# Patient Record
Sex: FEMALE | Race: BLACK OR AFRICAN AMERICAN | Employment: PART TIME | URBAN - METROPOLITAN AREA
[De-identification: names, ages, dates, MRNs, and addresses within clinical notes are randomized per-mention and may not be internally consistent; named-entity substitution may affect disease eponyms.]

---

## 2020-03-13 LAB — PAP SMEAR, EXTERNAL: NORMAL

## 2020-07-29 RX ORDER — NORGESTIMATE AND ETHINYL ESTRADIOL 0.25-0.035
KIT ORAL
Qty: 1 DOSE PACK | Refills: 11 | Status: SHIPPED | OUTPATIENT
Start: 2020-07-29 | End: 2021-05-02

## 2021-02-04 PROBLEM — B96.89 BACTERIAL VAGINOSIS: Status: ACTIVE | Noted: 2021-02-04

## 2021-02-04 PROBLEM — A59.01 TRICHOMONAL VAGINITIS: Status: ACTIVE | Noted: 2021-02-04

## 2021-02-04 PROBLEM — N76.0 BACTERIAL VAGINOSIS: Status: ACTIVE | Noted: 2021-02-04

## 2021-02-19 ENCOUNTER — OFFICE VISIT (OUTPATIENT)
Dept: OBGYN CLINIC | Age: 26
End: 2021-02-19
Payer: COMMERCIAL

## 2021-02-19 VITALS
HEIGHT: 65 IN | WEIGHT: 126 LBS | DIASTOLIC BLOOD PRESSURE: 72 MMHG | SYSTOLIC BLOOD PRESSURE: 116 MMHG | BODY MASS INDEX: 20.99 KG/M2

## 2021-02-19 DIAGNOSIS — Z12.4 ENCOUNTER FOR SCREENING FOR MALIGNANT NEOPLASM OF CERVIX: ICD-10-CM

## 2021-02-19 DIAGNOSIS — Z01.419 GYNECOLOGIC EXAM NORMAL: Primary | ICD-10-CM

## 2021-02-19 PROCEDURE — 99395 PREV VISIT EST AGE 18-39: CPT | Performed by: OBSTETRICS & GYNECOLOGY

## 2021-02-19 RX ORDER — NORGESTIMATE AND ETHINYL ESTRADIOL 0.25-0.035
1 KIT ORAL DAILY
Qty: 1 DOSE PACK | Refills: 11 | Status: SHIPPED | OUTPATIENT
Start: 2021-02-19 | End: 2021-05-02

## 2021-02-21 NOTE — PROGRESS NOTES
Tessy Mckeon is a 23 y.o. female, , Patient's last menstrual period was 2021., who presents today for the following:  Chief Complaint   Patient presents with    Annual Exam        No Known Allergies    Current Outpatient Medications   Medication Sig    norgestimate-ethinyl estradioL (ORTHO-CYCLEN, SPRINTEC) 0.25-35 mg-mcg tab Take 1 Tab by mouth daily for 336 days.  Previfem 0.25-35 mg-mcg tab TAKE 1 TABLET DAILY     No current facility-administered medications for this visit. Past Medical History:   Diagnosis Date    Bacterial vaginosis 2021    Trichomonal vaginitis 2021       History reviewed. No pertinent surgical history. History reviewed. No pertinent family history. Social History     Socioeconomic History    Marital status: SINGLE     Spouse name: Not on file    Number of children: Not on file    Years of education: Not on file    Highest education level: Not on file   Occupational History    Not on file   Social Needs    Financial resource strain: Not on file    Food insecurity     Worry: Not on file     Inability: Not on file    Transportation needs     Medical: Not on file     Non-medical: Not on file   Tobacco Use    Smoking status: Never Smoker    Smokeless tobacco: Never Used   Substance and Sexual Activity    Alcohol use:  Yes    Drug use: Never    Sexual activity: Yes     Birth control/protection: Pill   Lifestyle    Physical activity     Days per week: Not on file     Minutes per session: Not on file    Stress: Not on file   Relationships    Social connections     Talks on phone: Not on file     Gets together: Not on file     Attends Faith service: Not on file     Active member of club or organization: Not on file     Attends meetings of clubs or organizations: Not on file     Relationship status: Not on file    Intimate partner violence     Fear of current or ex partner: Not on file     Emotionally abused: Not on file     Physically abused: Not on file     Forced sexual activity: Not on file   Other Topics Concern    Not on file   Social History Narrative    Not on file         HPI  Annual    Review of Systems   Constitutional: Negative. Respiratory: Negative. Cardiovascular: Negative. Gastrointestinal: Negative. Genitourinary: Negative. Musculoskeletal: Negative. Skin: Negative. Neurological: Negative. Endo/Heme/Allergies: Negative. Psychiatric/Behavioral: Negative. All other systems reviewed and are negative. /72 (BP 1 Location: Right arm, BP Patient Position: Sitting)   Ht 5' 5\" (1.651 m)   Wt 126 lb (57.2 kg)   LMP 01/25/2021   BMI 20.97 kg/m²    OBGyn Exam     Constitutional:     General Appearance: healthy-appearing, well-nourished, and well-developed; Level of Distress: NAD. Ambulation: ambulating normally. Psychiatric:   Insight: good judgement. Mental Status: normal mood and affect and active and alert. Orientation: to time, place, and person. Memory: recent memory normal and remote memory normal.     Head: Head: normocephalic and atraumatic. Neck:   Neck: supple, FROM, trachea midline, and no masses. Lymph Nodes: no cervical LAD, supraclavicular LAD, axillary LAD, or inguinal LAD. Thyroid: no enlargement or nodules and non-tender. Lungs:   Respiratory effort: no dyspnea. Cardiovascular:     Pulses including femoral / pedal: normal throughout. Breast: Breast: no masses or abnormal secretions and normal appearance. Abdomen:   no tenderness, guarding, masses, rebound tenderness, or CVA tenderness and non-distended. Female :   External genitalia: no lesions or rash and normal.   Vagina: moist mucosa; .   Cervix: no discharge, inflammation, or cervical motion tenderness   Uterus: midline, smooth, and non-tender; normal size   Adnexae: no adnexal mass or tenderness and size WNL.    Bladder and Urethra: normal bladder and urethra (except where noted). Musculoskeletal[de-identified]   Motor Strength and Tone: normal tone and motor strength. Joints, Bones, and Muscles: no contractures, malalignment, tenderness, or bony abnormalities and normal movement of all extremities. Extremities: no cyanosis, edema, varicosities, or palpable cord. Skin:   Inspection and palpation: no rash, lesions, ulcer, induration, nodules, jaundice, or abnormal nevi and good turgor. Nails: normal.     Back: Thoracolumbar Appearance: normal curvature. 1. Gynecologic exam normal    - PAP IG, CT-NG-TV, RFX APTIMA HPV ASCUS ([id number])    2. Encounter for screening for malignant neoplasm of cervix          Follow-up and Dispositions    · Return in about 1 year (around 2/19/2022).

## 2021-02-22 LAB
C TRACH RRNA CVX QL NAA+PROBE: NEGATIVE
CYTOLOGIST CVX/VAG CYTO: NORMAL
CYTOLOGY CVX/VAG DOC CYTO: NORMAL
CYTOLOGY CVX/VAG DOC THIN PREP: NORMAL
DX ICD CODE: NORMAL
LABCORP, 190119: NORMAL
Lab: NORMAL
N GONORRHOEA RRNA CVX QL NAA+PROBE: NEGATIVE
OTHER STN SPEC: NORMAL
STAT OF ADQ CVX/VAG CYTO-IMP: NORMAL
T VAGINALIS RRNA SPEC QL NAA+PROBE: NEGATIVE

## 2021-04-30 ENCOUNTER — HOSPITAL ENCOUNTER (INPATIENT)
Age: 26
LOS: 2 days | Discharge: HOME | End: 2021-05-02
Attending: OBSTETRICS & GYNECOLOGY | Admitting: OBSTETRICS & GYNECOLOGY
Payer: COMMERCIAL

## 2021-04-30 ENCOUNTER — ANESTHESIA EVENT (OUTPATIENT)
Dept: SURGERY | Age: 26
End: 2021-04-30
Payer: COMMERCIAL

## 2021-04-30 ENCOUNTER — ANESTHESIA (OUTPATIENT)
Dept: SURGERY | Age: 26
End: 2021-04-30
Payer: COMMERCIAL

## 2021-04-30 DIAGNOSIS — G89.18 POSTOPERATIVE PAIN: Primary | ICD-10-CM

## 2021-04-30 PROBLEM — O46.90 VAGINAL BLEEDING IN PREGNANCY: Status: ACTIVE | Noted: 2021-04-30

## 2021-04-30 PROBLEM — Z34.90 PREGNANCY: Status: ACTIVE | Noted: 2021-04-30

## 2021-04-30 LAB
ABO + RH BLD: NORMAL
AMPHET UR QL SCN: NEGATIVE
APPEARANCE UR: CLEAR
BACTERIA URNS QL MICRO: NEGATIVE /HPF
BARBITURATES UR QL SCN: NEGATIVE
BASOPHILS # BLD: 0 K/UL (ref 0–0.1)
BASOPHILS NFR BLD: 0 % (ref 0–1)
BENZODIAZ UR QL: NEGATIVE
BILIRUB UR QL: NEGATIVE
BLOOD GROUP ANTIBODIES SERPL: NEGATIVE
CANNABINOIDS UR QL SCN: NEGATIVE
COCAINE UR QL SCN: NEGATIVE
COLOR UR: NORMAL
DIFFERENTIAL METHOD BLD: ABNORMAL
DRUG SCRN COMMENT,DRGCM: ABNORMAL
EOSINOPHIL # BLD: 0.2 K/UL (ref 0–0.4)
EOSINOPHIL NFR BLD: 1 % (ref 0–7)
ERYTHROCYTE [DISTWIDTH] IN BLOOD BY AUTOMATED COUNT: 12.6 % (ref 11.5–14.5)
FIBRONECTIN FETAL VAG QL: NORMAL
GLUCOSE UR STRIP.AUTO-MCNC: NEGATIVE MG/DL
HCT VFR BLD AUTO: 35.3 % (ref 35–47)
HGB BLD-MCNC: 11.2 G/DL (ref 11.5–16)
HGB UR QL STRIP: NEGATIVE
IMM GRANULOCYTES # BLD AUTO: 0.1 K/UL (ref 0–0.04)
IMM GRANULOCYTES NFR BLD AUTO: 0 % (ref 0–0.5)
KETONES UR QL STRIP.AUTO: NEGATIVE MG/DL
LEUKOCYTE ESTERASE UR QL STRIP.AUTO: NEGATIVE
LYMPHOCYTES # BLD: 2.6 K/UL (ref 0.8–3.5)
LYMPHOCYTES NFR BLD: 19 % (ref 12–49)
MCH RBC QN AUTO: 30.6 PG (ref 26–34)
MCHC RBC AUTO-ENTMCNC: 31.7 G/DL (ref 30–36.5)
MCV RBC AUTO: 96.4 FL (ref 80–99)
METHADONE UR QL: NEGATIVE
MONOCYTES # BLD: 1.5 K/UL (ref 0–1)
MONOCYTES NFR BLD: 11 % (ref 5–13)
MUCOUS THREADS URNS QL MICRO: NORMAL /LPF
NEUTS SEG # BLD: 9.9 K/UL (ref 1.8–8)
NEUTS SEG NFR BLD: 69 % (ref 32–75)
NITRITE UR QL STRIP.AUTO: NEGATIVE
OPIATES UR QL: POSITIVE
PCP UR QL: NEGATIVE
PH UR STRIP: 6 [PH] (ref 5–8)
PLATELET # BLD AUTO: 172 K/UL (ref 150–400)
PMV BLD AUTO: 13.3 FL (ref 8.9–12.9)
PROT UR STRIP-MCNC: NEGATIVE MG/DL
RBC # BLD AUTO: 3.66 M/UL (ref 3.8–5.2)
RBC #/AREA URNS HPF: NORMAL /HPF (ref 0–5)
SP GR UR REFRACTOMETRY: 1 (ref 1–1.03)
SPECIMEN EXP DATE BLD: NORMAL
UROBILINOGEN UR QL STRIP.AUTO: 0.1 EU/DL (ref 0.1–1)
WBC # BLD AUTO: 14.3 K/UL (ref 3.6–11)
WBC URNS QL MICRO: NORMAL /HPF (ref 0–4)

## 2021-04-30 PROCEDURE — 74011250636 HC RX REV CODE- 250/636

## 2021-04-30 PROCEDURE — 76010000392 HC C SECN EA ADDL 0.5 HR: Performed by: OBSTETRICS & GYNECOLOGY

## 2021-04-30 PROCEDURE — 76060000033 HC ANESTHESIA 1 TO 1.5 HR: Performed by: OBSTETRICS & GYNECOLOGY

## 2021-04-30 PROCEDURE — 99285 EMERGENCY DEPT VISIT HI MDM: CPT

## 2021-04-30 PROCEDURE — 80307 DRUG TEST PRSMV CHEM ANLYZR: CPT

## 2021-04-30 PROCEDURE — 88305 TISSUE EXAM BY PATHOLOGIST: CPT

## 2021-04-30 PROCEDURE — 74011250636 HC RX REV CODE- 250/636: Performed by: ANESTHESIOLOGY

## 2021-04-30 PROCEDURE — 75410000003 HC RECOV DEL/VAG/CSECN EA 0.5 HR

## 2021-04-30 PROCEDURE — 82731 ASSAY OF FETAL FIBRONECTIN: CPT

## 2021-04-30 PROCEDURE — 75810000275 HC EMERGENCY DEPT VISIT NO LEVEL OF CARE

## 2021-04-30 PROCEDURE — 76815 OB US LIMITED FETUS(S): CPT

## 2021-04-30 PROCEDURE — 74011250636 HC RX REV CODE- 250/636: Performed by: NURSE ANESTHETIST, CERTIFIED REGISTERED

## 2021-04-30 PROCEDURE — 86850 RBC ANTIBODY SCREEN: CPT

## 2021-04-30 PROCEDURE — 87086 URINE CULTURE/COLONY COUNT: CPT

## 2021-04-30 PROCEDURE — 75410000002 HC LABOR FEE PER 1 HR

## 2021-04-30 PROCEDURE — 74011250636 HC RX REV CODE- 250/636: Performed by: OBSTETRICS & GYNECOLOGY

## 2021-04-30 PROCEDURE — 88307 TISSUE EXAM BY PATHOLOGIST: CPT

## 2021-04-30 PROCEDURE — 76010000391 HC C SECN FIRST 1 HR: Performed by: OBSTETRICS & GYNECOLOGY

## 2021-04-30 PROCEDURE — 81001 URINALYSIS AUTO W/SCOPE: CPT

## 2021-04-30 PROCEDURE — 59515 CESAREAN DELIVERY: CPT | Performed by: OBSTETRICS & GYNECOLOGY

## 2021-04-30 PROCEDURE — 74011000258 HC RX REV CODE- 258: Performed by: NURSE ANESTHETIST, CERTIFIED REGISTERED

## 2021-04-30 PROCEDURE — 96360 HYDRATION IV INFUSION INIT: CPT

## 2021-04-30 PROCEDURE — 65410000002 HC RM PRIVATE OB

## 2021-04-30 PROCEDURE — 85025 COMPLETE CBC W/AUTO DIFF WBC: CPT

## 2021-04-30 PROCEDURE — 4A1HX4Z MONITORING OF PRODUCTS OF CONCEPTION, CARDIAC ELECTRICAL ACTIVITY, EXTERNAL APPROACH: ICD-10-PCS | Performed by: OBSTETRICS & GYNECOLOGY

## 2021-04-30 RX ORDER — SODIUM CHLORIDE 0.9 % (FLUSH) 0.9 %
5-40 SYRINGE (ML) INJECTION EVERY 8 HOURS
Status: DISCONTINUED | OUTPATIENT
Start: 2021-04-30 | End: 2021-04-30

## 2021-04-30 RX ORDER — OXYCODONE AND ACETAMINOPHEN 5; 325 MG/1; MG/1
1 TABLET ORAL
Status: DISCONTINUED | OUTPATIENT
Start: 2021-04-30 | End: 2021-05-02 | Stop reason: HOSPADM

## 2021-04-30 RX ORDER — PHENYLEPHRINE HCL IN 0.9% NACL 1 MG/10 ML
SYRINGE (ML) INTRAVENOUS
Status: DISPENSED
Start: 2021-04-30 | End: 2021-04-30

## 2021-04-30 RX ORDER — OXYTOCIN/RINGER'S LACTATE 20/1000 ML
PLASTIC BAG, INJECTION (ML) INTRAVENOUS
Status: DISCONTINUED | OUTPATIENT
Start: 2021-04-30 | End: 2021-04-30 | Stop reason: HOSPADM

## 2021-04-30 RX ORDER — OXYTOCIN/RINGER'S LACTATE 30/500 ML
87.3 PLASTIC BAG, INJECTION (ML) INTRAVENOUS AS NEEDED
Status: COMPLETED | OUTPATIENT
Start: 2021-04-30 | End: 2021-04-30

## 2021-04-30 RX ORDER — NALOXONE HYDROCHLORIDE 0.4 MG/ML
0.1 INJECTION, SOLUTION INTRAMUSCULAR; INTRAVENOUS; SUBCUTANEOUS AS NEEDED
Status: DISCONTINUED | OUTPATIENT
Start: 2021-04-30 | End: 2021-04-30

## 2021-04-30 RX ORDER — SODIUM CHLORIDE, SODIUM LACTATE, POTASSIUM CHLORIDE, CALCIUM CHLORIDE 600; 310; 30; 20 MG/100ML; MG/100ML; MG/100ML; MG/100ML
INJECTION, SOLUTION INTRAVENOUS
Status: DISCONTINUED | OUTPATIENT
Start: 2021-04-30 | End: 2021-04-30 | Stop reason: HOSPADM

## 2021-04-30 RX ORDER — SIMETHICONE 80 MG
80 TABLET,CHEWABLE ORAL AS NEEDED
Status: DISCONTINUED | OUTPATIENT
Start: 2021-04-30 | End: 2021-05-02 | Stop reason: HOSPADM

## 2021-04-30 RX ORDER — SODIUM CHLORIDE 0.9 % (FLUSH) 0.9 %
5-40 SYRINGE (ML) INJECTION AS NEEDED
Status: DISCONTINUED | OUTPATIENT
Start: 2021-04-30 | End: 2021-04-30

## 2021-04-30 RX ORDER — BISACODYL 5 MG
10 TABLET, DELAYED RELEASE (ENTERIC COATED) ORAL DAILY
Status: DISCONTINUED | OUTPATIENT
Start: 2021-04-30 | End: 2021-05-02 | Stop reason: HOSPADM

## 2021-04-30 RX ORDER — DEXAMETHASONE SODIUM PHOSPHATE 4 MG/ML
INJECTION, SOLUTION INTRA-ARTICULAR; INTRALESIONAL; INTRAMUSCULAR; INTRAVENOUS; SOFT TISSUE AS NEEDED
Status: DISCONTINUED | OUTPATIENT
Start: 2021-04-30 | End: 2021-04-30 | Stop reason: HOSPADM

## 2021-04-30 RX ORDER — ZOLPIDEM TARTRATE 5 MG/1
5 TABLET ORAL
Status: DISCONTINUED | OUTPATIENT
Start: 2021-04-30 | End: 2021-05-02 | Stop reason: HOSPADM

## 2021-04-30 RX ORDER — SODIUM CHLORIDE, SODIUM LACTATE, POTASSIUM CHLORIDE, CALCIUM CHLORIDE 600; 310; 30; 20 MG/100ML; MG/100ML; MG/100ML; MG/100ML
1000 INJECTION, SOLUTION INTRAVENOUS CONTINUOUS
Status: DISCONTINUED | OUTPATIENT
Start: 2021-04-30 | End: 2021-04-30

## 2021-04-30 RX ORDER — HYDROMORPHONE HYDROCHLORIDE 1 MG/ML
2 INJECTION, SOLUTION INTRAMUSCULAR; INTRAVENOUS; SUBCUTANEOUS ONCE
Status: COMPLETED | OUTPATIENT
Start: 2021-04-30 | End: 2021-04-30

## 2021-04-30 RX ORDER — NALOXONE HYDROCHLORIDE 0.4 MG/ML
0.4 INJECTION, SOLUTION INTRAMUSCULAR; INTRAVENOUS; SUBCUTANEOUS AS NEEDED
Status: DISCONTINUED | OUTPATIENT
Start: 2021-04-30 | End: 2021-05-02 | Stop reason: HOSPADM

## 2021-04-30 RX ORDER — KETOROLAC TROMETHAMINE 30 MG/ML
30 INJECTION, SOLUTION INTRAMUSCULAR; INTRAVENOUS EVERY 6 HOURS
Status: COMPLETED | OUTPATIENT
Start: 2021-04-30 | End: 2021-04-30

## 2021-04-30 RX ORDER — FENTANYL CITRATE 50 UG/ML
INJECTION, SOLUTION INTRAMUSCULAR; INTRAVENOUS AS NEEDED
Status: DISCONTINUED | OUTPATIENT
Start: 2021-04-30 | End: 2021-04-30 | Stop reason: HOSPADM

## 2021-04-30 RX ORDER — OXYTOCIN/RINGER'S LACTATE 30/500 ML
10 PLASTIC BAG, INJECTION (ML) INTRAVENOUS AS NEEDED
Status: COMPLETED | OUTPATIENT
Start: 2021-04-30 | End: 2021-04-30

## 2021-04-30 RX ORDER — OXYTOCIN/RINGER'S LACTATE 30/500 ML
PLASTIC BAG, INJECTION (ML) INTRAVENOUS
Status: COMPLETED
Start: 2021-04-30 | End: 2021-04-30

## 2021-04-30 RX ORDER — SODIUM CHLORIDE 0.9 % (FLUSH) 0.9 %
5-40 SYRINGE (ML) INJECTION AS NEEDED
Status: DISCONTINUED | OUTPATIENT
Start: 2021-04-30 | End: 2021-04-30 | Stop reason: HOSPADM

## 2021-04-30 RX ORDER — SODIUM CHLORIDE, SODIUM LACTATE, POTASSIUM CHLORIDE, CALCIUM CHLORIDE 600; 310; 30; 20 MG/100ML; MG/100ML; MG/100ML; MG/100ML
125 INJECTION, SOLUTION INTRAVENOUS CONTINUOUS
Status: DISCONTINUED | OUTPATIENT
Start: 2021-04-30 | End: 2021-05-01

## 2021-04-30 RX ORDER — KETOROLAC TROMETHAMINE 30 MG/ML
INJECTION, SOLUTION INTRAMUSCULAR; INTRAVENOUS
Status: COMPLETED
Start: 2021-04-30 | End: 2021-04-30

## 2021-04-30 RX ORDER — MORPHINE SULFATE IN 0.9 % NACL 150MG/30ML
PATIENT CONTROLLED ANALGESIA SYRINGE INTRAVENOUS CONTINUOUS
Status: DISCONTINUED | OUTPATIENT
Start: 2021-04-30 | End: 2021-05-01

## 2021-04-30 RX ORDER — OXYTOCIN/RINGER'S LACTATE 30/500 ML
10 PLASTIC BAG, INJECTION (ML) INTRAVENOUS AS NEEDED
Status: DISCONTINUED | OUTPATIENT
Start: 2021-04-30 | End: 2021-05-02 | Stop reason: HOSPADM

## 2021-04-30 RX ADMIN — KETOROLAC TROMETHAMINE 30 MG: 30 INJECTION, SOLUTION INTRAMUSCULAR; INTRAVENOUS at 23:56

## 2021-04-30 RX ADMIN — SODIUM CHLORIDE, POTASSIUM CHLORIDE, SODIUM LACTATE AND CALCIUM CHLORIDE: 600; 310; 30; 20 INJECTION, SOLUTION INTRAVENOUS at 04:25

## 2021-04-30 RX ADMIN — KETOROLAC TROMETHAMINE 30 MG: 30 INJECTION, SOLUTION INTRAMUSCULAR; INTRAVENOUS at 05:51

## 2021-04-30 RX ADMIN — Medication: at 04:39

## 2021-04-30 RX ADMIN — SODIUM CHLORIDE, POTASSIUM CHLORIDE, SODIUM LACTATE AND CALCIUM CHLORIDE 999 ML/HR: 600; 310; 30; 20 INJECTION, SOLUTION INTRAVENOUS at 05:30

## 2021-04-30 RX ADMIN — SODIUM CHLORIDE, POTASSIUM CHLORIDE, SODIUM LACTATE AND CALCIUM CHLORIDE 1000 ML: 600; 310; 30; 20 INJECTION, SOLUTION INTRAVENOUS at 04:30

## 2021-04-30 RX ADMIN — HYDROMORPHONE HYDROCHLORIDE 1 MG: 1 INJECTION, SOLUTION INTRAMUSCULAR; INTRAVENOUS; SUBCUTANEOUS at 06:03

## 2021-04-30 RX ADMIN — Medication 20 UNITS/HR: at 04:38

## 2021-04-30 RX ADMIN — KETOROLAC TROMETHAMINE 30 MG: 30 INJECTION, SOLUTION INTRAMUSCULAR; INTRAVENOUS at 12:49

## 2021-04-30 RX ADMIN — DEXAMETHASONE SODIUM PHOSPHATE 4 MG: 4 INJECTION, SOLUTION INTRA-ARTICULAR; INTRALESIONAL; INTRAMUSCULAR; INTRAVENOUS; SOFT TISSUE at 05:12

## 2021-04-30 RX ADMIN — SODIUM CHLORIDE, POTASSIUM CHLORIDE, SODIUM LACTATE AND CALCIUM CHLORIDE 125 ML/HR: 600; 310; 30; 20 INJECTION, SOLUTION INTRAVENOUS at 23:56

## 2021-04-30 RX ADMIN — SODIUM CHLORIDE, POTASSIUM CHLORIDE, SODIUM LACTATE AND CALCIUM CHLORIDE 125 ML/HR: 600; 310; 30; 20 INJECTION, SOLUTION INTRAVENOUS at 06:40

## 2021-04-30 RX ADMIN — KETOROLAC TROMETHAMINE 30 MG: 30 INJECTION, SOLUTION INTRAMUSCULAR; INTRAVENOUS at 18:18

## 2021-04-30 RX ADMIN — Medication 87.3 MILLI-UNITS/MIN: at 04:50

## 2021-04-30 RX ADMIN — Medication: at 07:18

## 2021-04-30 RX ADMIN — KETOROLAC TROMETHAMINE 30 MG: 30 INJECTION, SOLUTION INTRAMUSCULAR at 05:51

## 2021-04-30 RX ADMIN — FENTANYL CITRATE 200 MCG: 50 INJECTION, SOLUTION INTRAMUSCULAR; INTRAVENOUS at 04:39

## 2021-04-30 RX ADMIN — Medication 87.3 MILLI-UNITS/MIN: at 07:52

## 2021-04-30 RX ADMIN — SODIUM CHLORIDE, POTASSIUM CHLORIDE, SODIUM LACTATE AND CALCIUM CHLORIDE 1000 ML: 600; 310; 30; 20 INJECTION, SOLUTION INTRAVENOUS at 03:25

## 2021-04-30 RX ADMIN — CEFAZOLIN SODIUM 2 G: 1 INJECTION, POWDER, FOR SOLUTION INTRAMUSCULAR; INTRAVENOUS at 04:32

## 2021-04-30 NOTE — PROGRESS NOTES
Spiritual Care Assessment/Progress Note  [facility]      NAME: Jaimie Isabel      MRN: [id number]  AGE: 23 y.o. SEX: female  Restorationist Affiliation: Unknown   Language: English     4/30/2021     Total Time (in minutes): 68     Spiritual Assessment begun in [facility] LABOR & DELIVERY through conversation with:         [x]Patient        [x] Family    [] Friend(s)        Reason for Consult: Family care     Spiritual beliefs: (Please include comment if needed)     [x] Identifies with a tono tradition:         [] Supported by a tono community:            [] Claims no spiritual orientation:           [] Seeking spiritual identity:                [] Adheres to an individual form of spirituality:           [] Not able to assess:                           Identified resources for coping:      [x] Prayer                               [] Music                  [] Guided Imagery     [x] Family/friends                 [] Pet visits     [] Devotional reading                         [] Unknown     [] Other:                                               Interventions offered during this visit: (See comments for more details)    Patient Interventions: Prayer (actual), Other (comment)(Silent suppport)     Family/Friend(s):  Affirmation of emotions/emotional suffering, Affirmation of tono, Bridging, Catharsis/review of pertinent events in supportive environment, Coping skills reviewed/reinforced, Iconic (affirming the presence of God/Higher Power), Initial Assessment, Life review/legacy, Normalization of emotional/spiritual concerns, Prayer (actual), Prayer (assurance of), Restorationist beliefs/image of God discussed     Plan of Care:     [] Support spiritual and/or cultural needs    [] Support AMD and/or advance care planning process      [] Support grieving process   [] Coordinate Rites and/or Rituals    [] Coordination with community clergy   [] No spiritual needs identified at this time   [] Detailed Plan of Care below (See Comments)  [] Make referral to Music Therapy  [] Make referral to Pet Therapy     [] Make referral to Addiction services  [] Make referral to [facility]  [] Make referral to Spiritual Care Partner  [] No future visits requested        [x] Follow up upon further referrals     Comments:  Visited patient in the LD unit for family support. Patient Shaw Vazquez was being cared for by staff most of the time, and I provided care to patient's mom Jeffrey and boyfriend Amee Gtz. Jeffrey expressed her anxiety and seemed to process her emotions verbally and tearfully. She engaged in life review and shared about raising Marianne and her older sister, Romero's personality, future, and other attributes. Amee Gzt did not share much and expressed being overwhelmed by current situation. I provided chaplaincy education and listened with empathy and reflection while normalizing their anxious emotions. I facilitated storytelling and affirmed their support of each other. I prayed with Jeffrey per her request.  I advised of  availability. Chaplains will follow up if further referrals are requested. Chaplain Shanthi Davenport M.Div.    can be reached by calling the  at [facility]  [phone]

## 2021-04-30 NOTE — PROGRESS NOTES
0935: Patient transferred to [facility], room 323 from L&D via bed. Bedside report received from [address] [place] [address]. Patient oriented to room and call bell. Mother and baby handbook, new medication side effects, birth certificate form, local community resource sheet, hourly rounding, masking and visitation policy handouts given and discussed with patient. Vital signs obtained. Assessment completed. Patient instructed to call out for assistance. Patient verbalizes understanding.

## 2021-04-30 NOTE — PROCEDURES
Surgeon: Beth Keene MD    Assistant: none    Anesthesia: General     mL's    Urine output 50 mL's    Findings:  Viable male infant in transverse presentation back down  Minimal amniotic fluid  740 g    Drains: Walker    Specimens: Cord blood, cord gas, placenta    Complications: None    Surgical technique:  22-year-old black female  1 para 0 at 25 weeks, with prenatal care at [facility] for women who presented with abdominal pain, vaginal bleeding and findings suggestive of placental abruption. Anesthesia, nursing supervisor summoned. The patient was emergently taken to the operating room, where she was transferred to the OR table, simultaneously prepped, and a Walker catheter was placed. She was then draped and given general anesthesia. A 20 cm Pfannenstiel incision was made in the usual fashion, down to the level of the fascia. The fascia was then incised to the extent of the skin incision. Blunt dissection was then utilized to penetrate the rectus muscle the peritoneal surface. A disposable self-retaining retractor was placed. The lower uterine segment was visualized, and measured only approximately 4 cm, and was felt to not be sufficiently developed to deliver the fetus. In addition the fetus on ultrasound was transverse back down. Therefore decision was made to perform a classical  . A 15 cm vertical incision was made from the uterine fundus down to the lower uterine segment. The fetus was noted to be in transverse presentation with the fetal head on the patient's right-hand side. The breech aspect of the infant was then brought into the incision the upper extremities were flexed and delivered, followed easily by the fetal head. Minimal amount of amniotic fluid was noted. The cord was clamped and cut and the infant was handed off to awaiting  intensive care unit nursing staff. Viable male infant.     A segment of umbilical cord was obtained for cord gas analysis. Pitocin was given IV. The placenta was delivered, but there was some placental fragments adherent to the posterior wall which were removed with the aid of a wet lap pad. Wood clamps were then placed along the periphery of the uterine incision to minimize blood loss. The uterine defect was then irrigated. The uterine incision was closed in 2 layers of 0 PDS without difficulty. The incision was irrigated and a small amount of bleeding was noted. Pressure was applied over 2 to 3 minutes, and satisfactory hemostasis was obtained. A decision was made to close the peritoneal cavity. At that point, all lap pad counts were correct. The self-retaining retractor was removed. The rectus muscle was reapproximated with a running layer of 0 Vicryl. The subfascial structures were irrigated and bleeding sites were controlled with monopolar coagulating current. Once satisfactory hemostasis was obtained, the rectus sheath was reapproximated with 0 PDS. The subcutaneous fat was irrigated and reapproximated with a running layer of 3-0 Monocryl. The skin was then reapproximated with 3-0 Vicryl in a subcuticular fashion. Dermabond was applied. A 3M dressing was applied. The patient tolerated the procedure well. All counts were correct. The patient was then returned to her labor room to recover.

## 2021-04-30 NOTE — PROGRESS NOTES
0330:Dr. Decker Number informed about pt's arrival, SBAR done informed about Pt's compliant of leaking fluid and large amount of vaginal bleeding at home, moderate bright red blood possible mixed with fluid noted on towel that pt wore in, no active bleeding noted at this time, abdomen soft to palpate, pt denies pain now, strip reviewed with MD informed about decelerations, no contractions seen at this time. Recommendations made. MD states to monitor, strip reviewed by MD himself states ok. 2903: MD on unit strip reviewed by him.

## 2021-04-30 NOTE — PROGRESS NOTES
8023- Pt arrived to L&D via wheelchair. Pt presented to ER c/o vaginal bleeding. 0322- Fetal HR tracing on EFM. 0345- Pt denies feeling any contractions since coming to the hospital.    [phone]- On call OB/GYN at bedside asking pt H&P questions. RN at bedside changing pt's peripad, large amount of blood noted. OB/GYN assessed amount of bleeding. 0400- OB/GYN performed VE, states \"there is too much blood in the vagina for me to feel the cervix. \"    3349- On call OB/GYN performed bedside ultrasound states \"baby is transverse and there is little fluid. \"    Sere.Maw- OB/GYN called for emergent . 0425- Pt rushed to OR for emergent . 46- Live male infant delivered, see Delivery Summary for details. 7698- Pt returned to room, post-op recovery began, RN to remain at bedside monitoring pt.    0530- Pt hypotensive, IVF bolus initiated and CRNA administering IV phenylephrine. - Pt c/o pain, orders received and IV pain medications administered. 0630- Pt's pain easing up. Postpartum bleeding consistently monitored, small amount of rubra noted. 9996- Bedside shift change report given to Chiquis Watters RN and JEANNE Starkey RN.

## 2021-04-30 NOTE — H&P
Labor and Delivery/History & Physical    Primary Care Provider: None  Source of Information: Patient/family     History of Presenting Illness:   Jose Moreau is a  23 y.o. female BLACK/  LMP 11/3/2020 Estimated Date of Delivery: 8/10/21 estimated gestational age [id number], with uncomplicated prenatal care at [facility] for women, who presents with complaints of of abdominal pain and vaginal bleeding. Patient complains of abdominal pain which started about 2 AM, that comes and goes. It is nonradiating without alleviating or exacerbating factors without nausea vomiting fever chills dysuria or dyschezia. She also describes vaginal bleeding and states that she passed a bunch of blood and blood clots in the toilet prior to coming to the hospital.    She reports normal fetal movement. Review of System    Past Medical History:   Diagnosis Date    Bacterial vaginosis 2021    Trichomonal vaginitis 2021        History reviewed. No pertinent surgical history. Prior to Admission medications    Medication Sig Start Date End Date Taking? Authorizing Provider   norgestimate-ethinyl estradioL (ORTHO-CYCLEN, SPRINTEC) 0.25-35 mg-mcg tab Take 1 Tab by mouth daily for 336 days. 21  Jamshid Alves MD   Previfem 2.08-98 mg-mcg tab TAKE 1 TABLET DAILY 20   Jamshid Alves MD       No Known Allergies     History reviewed. No pertinent family history.      SOCIAL HISTORY:    Social History     Socioeconomic History    Marital status: SINGLE     Spouse name: Not on file    Number of children: Not on file    Years of education: Not on file    Highest education level: Not on file   Occupational History    Not on file   Social Needs    Financial resource strain: Not on file    Food insecurity     Worry: Not on file     Inability: Not on file    Transportation needs     Medical: Not on file     Non-medical: Not on file   Tobacco Use    Smoking status: Former Smoker    Smokeless tobacco: Never Used   Substance and Sexual Activity    Alcohol use: Not Currently    Drug use: Never    Sexual activity: Yes     Birth control/protection: Pill   Lifestyle    Physical activity     Days per week: Not on file     Minutes per session: Not on file    Stress: Not on file   Relationships    Social connections     Talks on phone: Not on file     Gets together: Not on file     Attends Baptism service: Not on file     Active member of club or organization: Not on file     Attends meetings of clubs or organizations: Not on file     Relationship status: Not on file    Intimate partner violence     Fear of current or ex partner: Not on file     Emotionally abused: Not on file     Physically abused: Not on file     Forced sexual activity: Not on file   Other Topics Concern     Service Not Asked    Blood Transfusions Not Asked    Caffeine Concern Not Asked    Occupational Exposure Not Asked   Lannis Anon Hazards Not Asked    Sleep Concern Not Asked    Stress Concern Not Asked    Weight Concern Not Asked    Special Diet Not Asked    Back Care Not Asked    Exercise Not Asked    Bike Helmet Not Asked   [address] [facility] Not Asked    Self-Exams Not Asked   Social History Narrative    Not on file          Objective:     Physical Exam:     Visit Vitals  /72   Pulse (!) 102   Temp 97.3 °F (36.3 °C)   Resp 18   Ht 5' 5\" (1.651 m)   Wt 63.5 kg (140 lb)   LMP 11/03/2020 (LMP Unknown)   SpO2 99%   BMI 23.30 kg/m²      O2 Device: None      Constitutional:   Chaperone: accepted and present. General Appearance: healthy-appearing, well-nourished, and well-developed; thin black female. Level of Distress: NAD. Ambulation: ambulating normally. Psychiatric:   Insight: good judgement. Mental Status: normal mood and affect and active and alert. Orientation: to time, place, and person.    Memory: recent memory normal and remote memory normal.     Head: Head: normocephalic and atraumatic. Lungs:   Respiratory effort: no dyspnea. Auscultation: no wheezing, rales/crackles, or rhonchi and breath sounds normal, good air movement, and CTA except as noted. Cardiovascular:   Heart Auscultation: normal S1 and S2; no murmurs, rubs, or gallops; and RRR. Pulses including femoral / pedal: normal throughout. Abdomen: Bowel Sounds: normal.   Inspection and Palpation: Soft, gravid, no tenderness, guarding, masses, rebound tenderness, or CVA tenderness and non-distended. Fetal heart tracin bpm baseline with moderate variability and spontaneous decelerations  Tocodynamometer: No contractions; contractions are palpable abdominally    Vagina: Copious blood clot  Cervix: 1 cm? Difficult to assess    Musculoskeletal[de-identified]   Motor Strength and Tone: normal tone and motor strength. Joints, Bones, and Muscles: no contractures, malalignment, tenderness, or bony abnormalities and normal movement of all extremities. Extremities: Edema; no cyanosis,  varicosities, or palpable cord. Neurologic:   Gait and Station: normal gait and station. Sensation: grossly intact. Reflexes: DTRs 2+ bilaterally throughout. Skin:   Inspection and palpation: no rash, lesions, ulcer, induration, nodules, jaundice, or abnormal nevi and good turgor. Nails: normal.     Back: Thoracolumbar Appearance: normal curvature.      Bedside ultrasound: Obvious oligohydramnios; transverse lie back down    24 Hour Results:    Recent Results (from the past 24 hour(s))   CBC WITH AUTOMATED DIFF    Collection Time: 21  4:30 AM   Result Value Ref Range    WBC 14.3 (H) 3.6 - 11.0 K/uL    RBC 3.66 (L) 3.80 - 5.20 M/uL    HGB 11.2 (L) 11.5 - 16.0 g/dL    HCT 35.3 35.0 - 47.0 %    MCV 96.4 80.0 - 99.0 FL    MCH 30.6 26.0 - 34.0 PG    MCHC 31.7 30.0 - 36.5 g/dL    RDW 12.6 11.5 - 14.5 %    PLATELET 400 182 - 410 K/uL    MPV 13.3 (H) 8.9 - 12.9 FL    NEUTROPHILS 69 32 - 75 %    LYMPHOCYTES 19 12 - 49 %    MONOCYTES 11 5 - 13 %    EOSINOPHILS 1 0 - 7 %    BASOPHILS 0 0 - 1 %    IMMATURE GRANULOCYTES 0 0.0 - 0.5 %    ABS. NEUTROPHILS 9.9 (H) 1.8 - 8.0 K/UL    ABS. LYMPHOCYTES 2.6 0.8 - 3.5 K/UL    ABS. MONOCYTES 1.5 (H) 0.0 - 1.0 K/UL    ABS. EOSINOPHILS 0.2 0.0 - 0.4 K/UL    ABS. BASOPHILS 0.0 0.0 - 0.1 K/UL    ABS. IMM.  GRANS. 0.1 (H) 0.00 - 0.04 K/UL    DF AUTOMATED           Imaging:   No orders to display        Assessment:     25w3d week gestation  Spontaneous decelerations on external fetal monitor  Vaginal bleeding and uterine contraction pattern suggestive of abruption       Plan:     Emergent  delivery       Home medications were reviewed    Signed By: Melba Mcgraw MD     2021

## 2021-04-30 NOTE — ANESTHESIA POSTPROCEDURE EVALUATION
Procedure(s):   SECTION.     general    Anesthesia Post Evaluation      Multimodal analgesia: multimodal analgesia used between 6 hours prior to anesthesia start to PACU discharge  Patient location during evaluation: bedside  Patient participation: complete - patient participated  Level of consciousness: awake and awake and alert  Pain score: 4  Pain management: adequate  Airway patency: patent  Anesthetic complications: no  Cardiovascular status: acceptable, hemodynamically stable and blood pressure returned to baseline  Respiratory status: acceptable, spontaneous ventilation, room air, unassisted and nonlabored ventilation  Hydration status: acceptable  Post anesthesia nausea and vomiting:  none  Final Post Anesthesia Temperature Assessment:  Normothermia (36.0-37.5 degrees C)      INITIAL Post-op Vital signs:   Vitals Value Taken Time   /72 21 0610   Temp 36.3 °C (97.3 °F) 21 0610   Pulse 98 21 0610   Resp 16 21 0610   SpO2 99 % 21 0610

## 2021-04-30 NOTE — PROGRESS NOTES
0700- Received bedside shift report from Stefano Michael RN.    4010- Pt transferred to MIU, and bedside report given to Kosta Anderson RN from [facility], RN.

## 2021-05-01 LAB
BASOPHILS # BLD: 0 K/UL (ref 0–0.1)
BASOPHILS NFR BLD: 0 % (ref 0–1)
DIFFERENTIAL METHOD BLD: ABNORMAL
EOSINOPHIL # BLD: 0 K/UL (ref 0–0.4)
EOSINOPHIL NFR BLD: 0 % (ref 0–7)
ERYTHROCYTE [DISTWIDTH] IN BLOOD BY AUTOMATED COUNT: 12.7 % (ref 11.5–14.5)
HCT VFR BLD AUTO: 23.5 % (ref 35–47)
HGB BLD-MCNC: 7.5 G/DL (ref 11.5–16)
IMM GRANULOCYTES # BLD AUTO: 0 K/UL (ref 0–0.04)
IMM GRANULOCYTES NFR BLD AUTO: 0 % (ref 0–0.5)
LYMPHOCYTES # BLD: 2.3 K/UL (ref 0.8–3.5)
LYMPHOCYTES NFR BLD: 15 % (ref 12–49)
MCH RBC QN AUTO: 31.1 PG (ref 26–34)
MCHC RBC AUTO-ENTMCNC: 31.9 G/DL (ref 30–36.5)
MCV RBC AUTO: 97.5 FL (ref 80–99)
MONOCYTES # BLD: 1.6 K/UL (ref 0–1)
MONOCYTES NFR BLD: 10 % (ref 5–13)
NEUTS SEG # BLD: 11.4 K/UL (ref 1.8–8)
NEUTS SEG NFR BLD: 75 % (ref 32–75)
PLATELET # BLD AUTO: 132 K/UL (ref 150–400)
PMV BLD AUTO: 13 FL (ref 8.9–12.9)
RBC # BLD AUTO: 2.41 M/UL (ref 3.8–5.2)
WBC # BLD AUTO: 15.3 K/UL (ref 3.6–11)

## 2021-05-01 PROCEDURE — 74011250636 HC RX REV CODE- 250/636: Performed by: OBSTETRICS & GYNECOLOGY

## 2021-05-01 PROCEDURE — 36415 COLL VENOUS BLD VENIPUNCTURE: CPT

## 2021-05-01 PROCEDURE — 74011250637 HC RX REV CODE- 250/637: Performed by: OBSTETRICS & GYNECOLOGY

## 2021-05-01 PROCEDURE — 65410000002 HC RM PRIVATE OB

## 2021-05-01 PROCEDURE — 85025 COMPLETE CBC W/AUTO DIFF WBC: CPT

## 2021-05-01 RX ORDER — IBUPROFEN 800 MG/1
800 TABLET ORAL
Status: DISCONTINUED | OUTPATIENT
Start: 2021-05-01 | End: 2021-05-02 | Stop reason: HOSPADM

## 2021-05-01 RX ADMIN — IBUPROFEN 800 MG: 800 TABLET, FILM COATED ORAL at 13:41

## 2021-05-01 RX ADMIN — SODIUM CHLORIDE, POTASSIUM CHLORIDE, SODIUM LACTATE AND CALCIUM CHLORIDE 125 ML/HR: 600; 310; 30; 20 INJECTION, SOLUTION INTRAVENOUS at 09:30

## 2021-05-01 RX ADMIN — BISACODYL 10 MG: 5 TABLET, COATED ORAL at 09:30

## 2021-05-01 NOTE — PROGRESS NOTES
2017: Shift assessment completed. Pt educated about importance of changing positions in bed frequently post-operatively. Pt encouraged to roll side to side at least every 1-2 hours while on bedrest. Pt voiced understanding. Pt states doing ok and denies needing anything at this time. 2120: Rounded. Pt lying in bed talking on cell phone. VS obtained. Water pitcher refilled. 2218: Rounded. Pt lying in bed with eyes closed and RR even and unlabored. 2351: Rounded. Pericare done with patient lying in bed. Pt able to lift hips off of bed to place fresh peripads underneath buttocks. Provon catheter care wipes used and pt educated. Scheduled Toradol 30 mg administered IV push and pt educated. New bag LR infusing via IV pump at 125 ml/hr. Pt states doing well right now and denies needing anything. 0140: Rounded. VS obtained. Pt. Denies needing anything. 0344: Rounded. Pt lying in bed with eyes closed. RR even and unlabored. 0518: Rounded. Indwelling urinary catheter discontinued and pt tolerated well. Catheter tip intact. Pericare done with patient lying in bed. Pt instructed to call for assistance from nurse before getting out of bed for the first time. Pt voiced understanding and call bell within reach. VS obtained and surgical dressing to abdominal incision removed. Pt tolerated well. Pt states doing ok and denies needing anything at this time.

## 2021-05-01 NOTE — PROGRESS NOTES
0701: Rounded with shift report. Report given to JUAN Bran RN. Pt lying in bed with eyes closed. RR even and unlabored.

## 2021-05-02 VITALS
WEIGHT: 140 LBS | HEART RATE: 77 BPM | TEMPERATURE: 97.9 F | SYSTOLIC BLOOD PRESSURE: 95 MMHG | BODY MASS INDEX: 23.32 KG/M2 | OXYGEN SATURATION: 100 % | RESPIRATION RATE: 16 BRPM | DIASTOLIC BLOOD PRESSURE: 57 MMHG | HEIGHT: 65 IN

## 2021-05-02 LAB
BACTERIA SPEC CULT: NORMAL
SPECIAL REQUESTS,SREQ: NORMAL

## 2021-05-02 PROCEDURE — 65410000002 HC RM PRIVATE OB

## 2021-05-02 PROCEDURE — 74011250637 HC RX REV CODE- 250/637: Performed by: OBSTETRICS & GYNECOLOGY

## 2021-05-02 RX ORDER — IBUPROFEN 800 MG/1
800 TABLET ORAL
Qty: 40 TAB | Refills: 0 | Status: SHIPPED | OUTPATIENT
Start: 2021-05-02 | End: 2022-02-12

## 2021-05-02 RX ORDER — OXYCODONE AND ACETAMINOPHEN 5; 325 MG/1; MG/1
1 TABLET ORAL
Qty: 30 TAB | Refills: 0 | Status: SHIPPED | OUTPATIENT
Start: 2021-05-02 | End: 2021-05-07

## 2021-05-02 RX ADMIN — IBUPROFEN 800 MG: 800 TABLET, FILM COATED ORAL at 01:11

## 2021-05-02 RX ADMIN — BISACODYL 10 MG: 5 TABLET, COATED ORAL at 08:42

## 2021-05-02 NOTE — PROGRESS NOTES
Progress Note    Patient: Jose Moreau MRN: [id number]  SSN: [id number]    YOB: 1995  Age: 23 y.o. Sex: female      Admit Date: 2021    LOS: 2 days     Subjective:     Patient is without complaints, she is tolerating a regular diet, she reports minimal lochia    Objective:     Vitals:    21 1630 21 2020 21 2316 21 0836   BP: (!) 99/57 107/73 104/71 (!) 95/57   Pulse: 83 88 87 77   Resp: 16 18 18 16   Temp: 98.2 °F (36.8 °C) 98 °F (36.7 °C) 98.1 °F (36.7 °C) 97.9 °F (36.6 °C)   SpO2: 100% 100% 100% 100%   Weight:       Height:            Physical Exam:   Heart is with regular rate and rhythm  Lungs are clear  Abdomen is soft appropriately tender postop  Fundus is below umbilicus  Wound is clean dry and intact  Extremities without clubbing cyanosis or edema    Lab/Data Review:  No new labs    Assessment:     Active Problems:    Pregnancy (2021)      Vaginal bleeding in pregnancy (2021)    Postoperative day #2 status post classical  for placental abruption, stable.       Plan:     Discharged home today    Signed By: Jeovany Adams MD     May 2, 2021

## 2021-05-02 NOTE — DISCHARGE INSTRUCTIONS
Patient Education   Patient Education   Patient Education   Patient Education   Patient Education   Patient Education   Ibuprofen (By mouth)   Ibuprofen (eye-bue-PROE-fen)  Treats pain and fever. This medicine is an NSAID. Brand Name(s): Advil, Advil Children's, Advil Liqui-Gels, Advil Migraine, All-Purpose First Aid Kit, Children's Ibuprofen, Children's Motrin, Comfort Pac, Concentrated Motrin Infants' Drops, Equate Ibuprofen Alvin Strength, Genpril, [facility] Ibuprofen Infants', [facility] Children's Ibuprofen, [facility] Ibuprofen, [facility] Ibuprofen Alvin Strength   There may be other brand names for this medicine. When This Medicine Should Not Be Used: This medicine is not right for everyone. Do not use if you had an allergic reaction (including asthma) to ibuprofen, aspirin, or another NSAID, or right before or after heart surgery. How to Use This Medicine:   Capsule, Liquid Filled Capsule, Suspension, Tablet, Chewable Tablet  · Your doctor will tell you how much medicine to use. Do not use more than directed. · Prescription ibuprofen should come with a Medication Guide. Ask your pharmacist for the Medication Guide if you do not have one. · Follow the instructions on the medicine label if you are using this medicine without a prescription. · Take this medicine with food or milk if it upsets your stomach. · Oral liquid: Shake well just before using. Measure with a marked measuring spoon, oral syringe, or medicine cup. · Chewable tablet: Chew completely before you swallow it. Then drink some water to make sure you swallow all of the medicine. · For Children: Ask your pharmacist if you are not sure how much medicine to give a child. The dose is usually based on weight, not age. Never give more medicine than directed. · For Adults: Do not take more than 6 pills in 1 day (24 hours) unless your doctor tells you to. · Missed dose:  If you take this medicine on a regular basis and miss a dose, take it as soon as you can. If it is almost time for your next dose, wait until then to use the medicine and skip the missed dose. Do not use extra medicine to make up for a missed dose. · Store the medicine in a closed container at room temperature, away from heat, moisture, and direct light. Do not freeze the oral liquid. Drugs and Foods to Avoid:   Ask your doctor or pharmacist before using any other medicine, including over-the-counter medicines, vitamins, and herbal products. · Some foods and medicine can affect how ibuprofen works. Tell your doctor if you are also using lithium, methotrexate, a blood thinner (such as warfarin), a steroid medicine (such as hydrocortisone, prednisolone, prednisone), a diuretic (water pill), or an ACE inhibitor blood pressure medicine. · Do not use any other NSAID medicine unless your doctor says it is okay. Some other NSAIDs are aspirin, diclofenac, naproxen, or celecoxib. · Do not drink alcohol while you are using this medicine. Warnings While Using This Medicine:   · Tell your doctor if you are pregnant or breastfeeding. Do not use this medicine during the later part of pregnancy. · Tell your doctor if you have kidney disease, liver disease, asthma, lupus or a similar connective tissue disease, or a history of ulcers or other digestion problems. Tell your doctor if you smoke or have heart or blood circulation problems, including high blood pressure, heart failure (CHF), or bleeding problems. · This medicine may cause the following problems:  ¨ Bleeding and ulcers in the stomach or intestines  ¨ Higher risk of heart attack or stroke  ¨ Liver damage  ¨ Kidney damage  ¨ Vision problems  · Call your doctor if symptoms get worse, pain lasts more than 10 days, or fever lasts more than 3 days. · This medicine might contain sugar or phenylalanine (aspartame).   · Tell any doctor or dentist who treats you that you are using this medicine. · Keep all medicine out of the reach of children. Never share your medicine with anyone. Possible Side Effects While Using This Medicine:   Call your doctor right away if you notice any of these side effects:  · Allergic reaction: Itching or hives, swelling in your face or hands, swelling or tingling in your mouth or throat, chest tightness, trouble breathing  · Blistering, peeling, or red skin rash  · Change in how much or how often you urinate  · Chest pain that may spread to your arms, jaw, back, or neck, trouble breathing, nausea, unusual sweating, faintness  · Chest pain, trouble breathing, weakness on one side of your body, severe headache, trouble seeing or talking, pain in your lower leg  · Dark urine or pale stools, nausea, vomiting, loss of appetite, stomach pain, yellow skin or eyes  · Fever, neck pain, stiff neck  · Severe stomach pain, vomiting blood, bloody or black, tarry stools  · Swelling in your hands, ankles, or feet, rapid weight gain  · Trouble seeing, blind spots, change in how you see colors  · Unusual bleeding, bruising, or weakness  If you notice these less serious side effects, talk with your doctor:   · Constipation, diarrhea, gas, mild upset stomach  · Dizziness, headache, ringing in the ears  If you notice other side effects that you think are caused by this medicine, tell your doctor. Call your doctor for medical advice about side effects. You may report side effects to FDA at 4-882-FDA-2702  © 2017 [facility] Information is for End User's use only and may not be sold, redistributed or otherwise used for commercial purposes. The above information is an  only. It is not intended as medical advice for individual conditions or treatments. Talk to your doctor, nurse or pharmacist before following any medical regimen to see if it is safe and effective for you.         Oxycodone/Acetaminophen (Percocet, Roxicet) - (By mouth)   Why this medicine is used:   Treats pain. This medicine contains a narcotic pain reliever. Contact a nurse or doctor right away if you have:  · Extreme weakness, shallow breathing, slow heartbeat  · Sweating or cold, clammy skin  · Skin blisters, rash, or peeling     Common side effects:  · Constipation  · Nausea, vomiting  · Tiredness  2017 [employer] Information is for End User's use only and may not be sold, redistributed or otherwise used for commercial purposes.  Section: What to Expect at Home    Your Recovery:  A  section, or , is surgery to deliver your baby through a cut, called an incision that the doctor makes in your lower belly and uterus. You may have some pain in your lower belly and need pain medicine for 1 to 2 weeks. You can expect some vaginal bleeding for several weeks. You will probably need about 6 weeks to fully recover. It is important to take it easy while the incision is healing. Avoid heavy lifting, strenuous activities, or exercises that strain the belly muscles while you are recovering. Ask a family member or friend for help with housework, cooking, and shopping. This care sheet gives you a general idea about how long it will take for you to recover. But each person recovers at a different pace. Follow the steps below to get better as quickly as possible. How can you care for yourself at home? Activity  · Rest when you feel tired. Getting enough sleep will help you recover. · Try to walk each day. Start by walking a little more than you did the day before. Bit by bit, increase the amount you walk. Walking boosts blood flow and helps prevent pneumonia, constipation, and blood clots. · Avoid strenuous activities, such as bicycle riding, jogging, weightlifting, and aerobic exercise, for 6 weeks or until your doctor says it is okay. · Until your doctor says it is okay, do not lift anything heavier than your baby.   · Do not do sit-ups or other exercise that strain the belly muscles for 6 weeks or until your doctor says it is okay. · Hold a pillow over your incision when you cough or take deep breaths. This will support your belly and decrease your pain. · You may shower as usual. Pat the incision dry when you are done. · You will have some vaginal bleeding. Wear sanitary pads. Do not douche or use tampons until your doctor says it is okay. · Ask your doctor when you can drive again. · You will probably need to take at least 6 weeks off work. It depends on the type of work you do and how you feel. · Wait until you are healed (about 4 to 6 weeks) before you have sexual intercourse. Your doctor will tell you when it is okay to have sex. · Talk to your doctor about birth control. You can get pregnant even before your period returns. Also, you can get pregnant while you are breast-feeding. Incision care  Your skin is your body's first line of defense against germs, but an incision site leaves an easy way for germs to enter your body. To prevent infection:  · Clean your hands frequently and before and after changing any touching any dressings. · If you have strips of tape on the incision, leave the tape on for a week or until it falls off. · Look at your incision closely every day for any changes. Contact your doctor if you experience any signs of infection, such as fever or increased redness at the surgical site. · Wash the area daily with warm, soapy water, and pat it dry. Don't use hydrogen peroxide or alcohol, which can slow healing. You may cover the area with a gauze bandage if it weeps or rubs against clothing. Change the bandage every day. · Keep the area clean and dry. Diet  · You can eat your normal diet. If your stomach is upset, try bland, low-fat foods like plain rice, broiled chicken, toast, and yogurt. · Drink plenty of fluids (unless your doctor tells you not to).   · You may notice that your bowel movements are not regular right after your surgery. This is common. Try to avoid constipation and straining with bowel movements. You may want to take a fiber supplement every day. If you have not had a bowel movement after a couple of days, ask your doctor about taking a mild laxative. · If you are breast-feeding, do not drink any alcohol. Medicines  · Take pain medicines exactly as directed. · If the doctor gave you a prescription medicine for pain, take it as prescribed. · If you are not taking a prescription pain medicine, ask your doctor if you can take an over-the-counter medicine such as acetaminophen (Tylenol), ibuprofen (Advil, Motrin), or naproxen (Aleve), for cramps. Read and follow all instructions on the label. Do not take aspirin, because it can cause more bleeding. Do not take acetaminophen (Tylenol) and other acetaminophen containing medications (i.e. Percocet) at the same time. · If you think your pain medicine is making you sick to your stomach:  · Take your medicine after meals (unless your doctor has told you not to). · Ask your doctor for a different pain medicine. · If your doctor prescribed antibiotics, take them as directed. Do not stop taking them just because you feel better. You need to take the full course of antibiotics. Mental Health  · Many women get the \"baby blues\" during the first few days after childbirth. You may lose sleep, feel irritable, and cry easily. You may feel happy one minute and sad the next. Hormone changes are one cause of these emotional changes. Also, the demands of a new baby, along with visits from relatives or other family needs, add to a mother's stress. The \"baby blues\" often peak around the fourth day. Then they ease up in less than 2 weeks. · If your moodiness or anxiety lasts for more than 2 weeks, or if you feel like life is not worth living, you may have postpartum depression. This is different for each mother.  Some mothers with serious depression may worry intensely about their infant's well-being. Others may feel distant from their child. Some mothers might even feel that they might harm their baby. A mother may have signs of paranoia, wondering if someone is watching her. · With all the changes in your life, you may not know if you are depressed. Pregnancy sometimes causes changes in how you feel that are similar to the symptoms of depression. · Symptoms of depression include:  · Feeling sad or hopeless and losing interest in daily activities. These are the most common symptoms of depression. · Sleeping too much or not enough. · Feeling tired. You may feel as if you have no energy. · Eating too much or too little. · [employer] ([employer]) offers a Warm line; Chat with the Expert phone sessions; Information and Articles about Pregnancy and Postpartum Mood Disorders; Comprehensive List of Free Support Groups; Knowledgeable local coordinators who will offer support, information, and resources; Guide to Resources on [employer]; Calendar of events in the  mood disorders community; Latest News and Research; and [facility] [address] for [employer]. Remember - You are not alone; You are not to blame; With help, you will be well. 1-085-287-PPD(9756). WWW. [employer]. NET   · Writing or talking about death, such as writing suicide notes or talking about guns, knives, or pills. Keep the numbers for these national suicide hotlines: 1-280-724-TALK (8-244.129.9486) and 1-814-JQCJXKL (5-438.517.3550). If you or someone you know talks about suicide or feeling hopeless, get help right away. Other instructions  · If you breast-feed your baby, you may be more comfortable while you are healing if you place the baby so that he or she is not resting on your belly. Try tucking your baby under your arm, with his or her body along the side you will be feeding on. Support your baby's upper body with your arm.  With that hand you can control your baby's head to bring his or her mouth to your breast. This is sometimes called the football hold. Follow-up care is a key part of your treatment and safety. Be sure to make and go to all appointments, and call your doctor if you are having problems. It's also a good idea to know your test results and keep a list of the medicines you take. When should you call for help? Call 911 anytime you think you may need emergency care. For example, call if:  · You are thinking of hurting yourself, your baby, or anyone else. · You passed out (lost consciousness). · You have symptoms of a blood clot in your lung (called a pulmonary embolism). These may include:  · Sudden chest pain. · Trouble breathing. · Coughing up blood. Call your doctor now or seek immediate medical care if:    · You have severe vaginal bleeding. · You are soaking through a pad each hour for 2 or more hours. · Your vaginal bleeding seems to be getting heavier or is still bright red 4 days after delivery. · You are dizzy or lightheaded, or you feel like you may faint. · You are vomiting or cannot keep fluids down. · You have a fever. · You have new or more belly pain. · You have loose stitches, or your incision comes open. · You have symptoms of infection, such as:  · Increased pain, swelling, warmth, or redness. · Red streaks leading from the incision. · Pus draining from the incision. · A fever  · You pass tissue (not just blood). · Your vaginal discharge smells bad. · Your belly feels tender or full and hard. · Your breasts are continuously painful or red. · You feel sad, anxious, or hopeless for more than a few days. · You have sudden, severe pain in your belly. · You have symptoms of a blood clot in your leg (called a deep vein thrombosis), such as:  · Pain in your calf, back of the knee, thigh, or groin. · Redness and swelling in your leg or groin.   · You have symptoms of preeclampsia, such as:  · Sudden swelling of your face, hands, or feet. · New vision problems (such as dimness or blurring). · A severe headache. · Your blood pressure is higher than it should be or rises suddenly. · You have new nausea or vomiting. Watch closely for changes in your health, and be sure to contact your doctor if you have any problems. Stress in Parents of Infants: Care Instructions  Your Care Instructions     Meeting the increased demands of being a new parent can be a big challenge. It is easy to get overtired and overwhelmed during the first weeks. What used to be a simple chore, such as buying groceries, is not so simple now. Plus, you have new chores, including feeding and changing your new baby. At the end of the day, you may be so tired that you feel like crying. Instead of looking forward to the next day, you may be dreading tomorrow. Like many new parents, you are burned out from the stress of having a new baby. Stress affects each of us differently, and the most effective ways to relieve it are different for each person. You can try different methods to find out which ones work best for you. As the weeks go by, you will begin to develop a rhythm with your baby. Tasks that now seem to take forever will become easier. Many women get the \"baby blues\" during the first few days after childbirth. If you are a new mother and the \"baby blues\" last more than a few days, call your doctor right away. Depression is a medical condition that requires treatment. Follow-up care is a key part of your treatment and safety. Be sure to make and go to all appointments, and call your doctor if you are having problems. It's also a good idea to know your test results and keep a list of the medicines you take. How can you care for yourself at home? · Be kind to yourself. Your new baby takes a lot of work, but he or she can give you a lot of pleasure too. Do not worry about housekeeping for a while.   · Allow your friends to bring you meals or do chores. · Limit visitors to as few as you feel you can handle, or ask them not to visit for a while. Before they come, set a limit on how long they will stay. · Sleep when your baby sleeps. Even a short nap helps. · Find what triggers your stress, and avoid those things as much as you can. · If you breastfeed, learn how to collect and store some breast milk so your partner or  can feed the baby while you sleep. Experts usually recommend waiting about a month until breastfeeding is going well before offering a bottle. · Eat a balanced diet so you can keep up your energy. · Drink plenty of fluids throughout the day. · Avoid caffeine and alcohol. Caffeine is found in coffee, tea, cola drinks, chocolate, and other foods. · Limit medicines that can make you more tired, such as tranquilizers and cold and allergy medicines. · Get regular daily exercise, such as walks, to help improve your mood. Rest after you exercise. · Be honest with yourself and those who care about you. Tell them you are stressed and tired. · Talking to other new parents can help. Ask your doctor or child's doctor to suggest support groups for new parents. Hearing that someone else is having the same experiences you are can help a lot. · If you have the baby blues for more than a few days, call your doctor right away. When should you call for help? Call 911 anytime you think you may need emergency care. For example, call if:    · You have thoughts of hurting yourself, your baby, or another person. Call your doctor now or seek immediate medical care if:    · You are having trouble caring for yourself or your baby. Watch closely for changes in your health, and be sure to contact your doctor if you have any problems. Where can you learn more? Go to http://www.gray.com/  Enter H142 in the search box to learn more about \"Stress in Parents of Infants: Care Instructions. \"  Current as of: May 27, 2020               Content Version: 12.8  © 2006-2021 [employer]. Care instructions adapted under license by [employer] (which disclaims liability or warranty for this information). If you have questions about a medical condition or this instruction, always ask your healthcare professional. [address] any warranty or liability for your use of this information. Depression After Childbirth: Care Instructions  Overview     Many women get the \"baby blues\" during the first few days after childbirth. You may lose sleep, feel irritable, and cry easily. You may feel happy one minute and sad the next. Hormone changes are one cause of these emotional changes. Also, the demands of a new baby, along with visits from relatives or other family needs, can add to the stress. The \"baby blues\" often peak around the fourth day. Then they ease up in less than 2 weeks. If your moodiness or anxiety lasts for more than 2 weeks, or if you feel like life is not worth living, you may have postpartum depression. This is different for each person. Some mothers with serious depression may worry intensely about their infant's well-being. Others may feel distant from their child. Some mothers may even feel that they might harm their baby. Some may have signs of paranoia, wondering if someone is watching them. Depression is not a sign of weakness. It's a medical condition that requires treatment. Medicine and counseling often work well to reduce depression. Talk to your doctor about taking antidepressant medicine while breastfeeding. Follow-up care is a key part of your treatment and safety. Be sure to make and go to all appointments, and call your doctor if you are having problems. It's also a good idea to know your test results and keep a list of the medicines you take. How do you know if you are depressed? With all the changes in your life, you may not know if you are depressed. Pregnancy sometimes causes changes in how you feel that are similar to the symptoms of depression. Symptoms of depression include:  · Feeling sad or hopeless and losing interest in daily activities. These are the most common symptoms of depression. · Sleeping too much or not enough. · Feeling tired. You may feel as if you have no energy. · Eating too much or too little. · Writing or talking about death, such as writing suicide notes or talking about guns, knives, or pills. Keep the numbers for these national suicide hotlines: 0-710-023-TALK (1-566.463.8332) and 6-542-RRQJUIA (6-519.494.8590). If you or someone you know talks about suicide or feeling hopeless, get help right away. How can you care for yourself at home? · Be safe with medicines. Take your medicines exactly as prescribed. Call your doctor if you think you are having a problem with your medicine. · Eat a healthy diet so that you can keep up your energy. · Get regular daily exercise, such as walks, to help improve your mood. · Get as much sunlight as possible. Keep your shades and curtains open. Get outside as much as you can. · Avoid using alcohol or other substances to feel better. · Get as much rest and sleep as possible. Avoid doing too much. Being too tired can increase depression. · Play stimulating music throughout your day and soothing music at night. · Schedule outings and visits with friends and family. Ask them to call you regularly, so that you don't feel alone. · Ask for help with preparing food and other daily tasks. Family and friends are often happy to help with a . · Be honest with yourself and those who care about you. Tell them about your struggle. · Join a support group of new mothers. No one can better understand the challenges of caring for a  than other new mothers. · If you feel like life is not worth living or you're feeling hopeless, get help right away.  Keep the numbers for these national suicide hotlines: 8-145-012-TALK (8-920.293.4950) and 3-338-NCYFNZE (7-564.668.2202). When should you call for help? Call 911 anytime you think you may need emergency care. For example, call if:    · You feel you cannot stop from hurting yourself, your baby, or someone else. Call your doctor now or seek immediate medical care if:    · You are having trouble caring for yourself or your baby.     · You hear voices. Watch closely for changes in your health, and be sure to contact your doctor if:    · You have problems with your depression medicine.     · You do not get better as expected. Where can you learn more? Go to http://www.gray.com/  Enter [id number] in the search box to learn more about \"Depression After Childbirth: Care Instructions. \"  Current as of: September 23, 2020               Content Version: 12.8  © 2006-2021 [employer]. Care instructions adapted under license by [employer] (which disclaims liability or warranty for this information). If you have questions about a medical condition or this instruction, always ask your healthcare professional. [place] [address] any warranty or liability for your use of this information. Constipation: Care Instructions  Your Care Instructions     Constipation means that you have a hard time passing stools (bowel movements). People pass stools from 3 times a day to once every 3 days. What is normal for you may be different. Constipation may occur with pain in the rectum and cramping. The pain may get worse when you try to pass stools. Sometimes there are small amounts of bright red blood on toilet paper or the surface of stools. This is because of enlarged veins near the rectum (hemorrhoids). A few changes in your diet and lifestyle may help you avoid ongoing constipation. Your doctor may also prescribe medicine to help loosen your stool. Some medicines can cause constipation.  These include pain medicines and antidepressants. Tell your doctor about all the medicines you take. Your doctor may want to make a medicine change to ease your symptoms. Follow-up care is a key part of your treatment and safety. Be sure to make and go to all appointments, and call your doctor if you are having problems. It's also a good idea to know your test results and keep a list of the medicines you take. How can you care for yourself at home? · Drink plenty of fluids. If you have kidney, heart, or liver disease and have to limit fluids, talk with your doctor before you increase the amount of fluids you drink. · Include high-fiber foods in your diet each day. These include fruits, vegetables, beans, and whole grains. · Get at least 30 minutes of exercise on most days of the week. Walking is a good choice. You also may want to do other activities, such as running, swimming, cycling, or playing tennis or team sports. · Take a fiber supplement, such as Citrucel or Metamucil, every day. Read and follow all instructions on the label. · Schedule time each day for a bowel movement. A daily routine may help. Take your time having your bowel movement. · Support your feet with a small step stool when you sit on the toilet. This helps flex your hips and places your pelvis in a squatting position. · Your doctor may recommend an over-the-counter laxative to relieve your constipation. Examples are Milk of Magnesia and MiraLax. Read and follow all instructions on the label. Do not use laxatives on a long-term basis. When should you call for help? Call your doctor now or seek immediate medical care if:    · You have new or worse belly pain.     · You have new or worse nausea or vomiting.     · You have blood in your stools. Watch closely for changes in your health, and be sure to contact your doctor if:    · Your constipation is getting worse.     · You do not get better as expected. Where can you learn more?   Go to http://www.gray.com/  Enter P343 in the search box to learn more about \"Constipation: Care Instructions. \"  Current as of: February 26, 2020               Content Version: 12.8  © 2006-2021 [employer]. Care instructions adapted under license by [employer] (which disclaims liability or warranty for this information). If you have questions about a medical condition or this instruction, always ask your healthcare professional. [address] any warranty or liability for your use of this information. Breastfeeding: Care Instructions  Overview     Breastfeeding has many benefits. It may lower your baby's chances of getting an infection. It also may make it less likely that your baby will have problems such as diabetes and obesity later in life. Breastfeeding also helps you bond with your baby. In the first days after birth, your breasts make a thick, yellow liquid called colostrum. This liquid gives your baby nutrients and antibodies against infection. It is all that babies need in the first days after birth. Your breasts will fill with milk a few days after the birth. Breastfeeding is a skill that gets better with practice. Be patient with yourself and your baby. If you have trouble, you can get help and keep breastfeeding. Follow-up care is a key part of your treatment and safety. Be sure to make and go to all appointments, and call your doctor if you are having problems. It's also a good idea to know your test results and keep a list of the medicines you take. How can you care for yourself at home? · Breastfeed your baby whenever he or she is hungry. In the first 2 weeks, your baby will breastfeed at least 8 times in a 24-hour period. This will help you keep up your supply of milk. Signs that your baby is hungry include:  ? Sucking on his or her hands. ? [place] his or her lips.   ? Turning his or her head toward your breast.  · Put a bed pillow or a nursing pillow on your lap to support your arms and your baby. · Hold your baby in a comfortable position. ? You can hold your baby in several ways. One of the most common positions is the cradle hold. One arm supports your baby, with his or her head in the bend of your elbow. Your open hand supports your baby's bottom or back. Your baby's belly lies against yours. ? If you had your baby by , or , try the football hold. This position keeps your baby off your belly. Tuck your baby under your arm, with his or her body along the side you will be feeding on. Support your baby's upper body with your arm. With that hand you can control your baby's head to bring his or her mouth to your breast.  ? Try different positions with each feeding. If you are having problems, ask for help from your doctor or a lactation consultant. · To get your baby to latch on:  ? Support and narrow your breast with one hand using a \"U hold,\" with your thumb on the outer side of your breast and your fingers on the inner side. You can also use a \"C hold,\" with all your fingers below the nipple and your thumb above it. Try the different holds to get the deepest latch for whichever breastfeeding position you use. Your other arm is behind your baby's back, with your hand supporting the base of the baby's head. Position your fingers and thumb to point toward your baby's ears. ? You can touch your baby's lower lip with your nipple to get your baby to open his or her mouth. Wait until your baby opens up really wide, like a big yawn. Then be sure to bring the baby quickly to your breast--not your breast to the baby. As you bring your baby toward your breast, use your other hand to support the breast and guide it into his or her mouth. ? Both the nipple and a large portion of the darker area around the nipple (areola) should be in the baby's mouth.  The baby's lips should be flared outward, not folded in (inverted). ? Listen for a regular sucking and swallowing pattern while the baby is feeding. If you cannot see or hear a swallowing pattern, watch the baby's ears, which will wiggle slightly when the baby swallows. If the baby's nose appears to be blocked by your breast, bring your baby's body closer to you. This will help tilt the baby's head back slightly, so just the edge of one nostril is clear for breathing. ? When your baby is latched, you can usually remove your hand from supporting your breast and bring it under your baby to cradle him or her. Now just relax and breastfeed your baby. · You will know that your baby is feeding well when:  ? His or her mouth covers a lot of the areola, and the lips are flared out.  ? His or her chin and nose rest against your breast.  ? Sucking is deep and rhythmic, with short pauses. ? You are able to see and hear your baby swallowing. ? You do not feel pain in your nipple. · Offer both breasts to your baby at each feeding. Each time you breastfeed, switch which breast you start with. · Anytime you need to remove your baby from the breast, put one finger in the corner of his or her mouth. Push your finger between your baby's gums to gently break the seal. If you do not break the tight seal before you remove your baby, your nipples can become sore, cracked, or bruised. · After feeding your baby, gently pat his or her back to let out any swallowed air. After your baby burps, offer the breast again, or offer the other breast. Sometimes a baby will want to keep feeding after being burped. When should you call for help? Call your doctor now or seek immediate medical care if:    · You have symptoms of a breast infection, such as:  ? Increased pain, swelling, redness, or warmth around a breast.  ? Red streaks extending from the breast.  ? Pus draining from a breast.  ? A fever.     · Your baby has no wet diapers for 6 hours.    Watch closely for changes in your health, and be sure to contact your doctor if:    · Your baby has trouble latching on to your breast.     · You continue to have pain or discomfort when breastfeeding.     · You have other questions or concerns. Where can you learn more? Go to http://www.gray.com/  Enter P492 in the search box to learn more about \"Breastfeeding: Care Instructions. \"  Current as of: October 8, 2020               Content Version: 12.8  © 2006-2021 [employer]. Care instructions adapted under license by [employer] (which disclaims liability or warranty for this information). If you have questions about a medical condition or this instruction, always ask your healthcare professional. [address] any warranty or liability for your use of this information.

## 2021-05-02 NOTE — PROGRESS NOTES
1140 Discharge plan of care/case management plan validated with provider discharge order. Discharge instructions given to pt with verbal understanding. Prescriptions at pharmacy on file. Discussed when to take medication and side effects. Discussed follow up appointment. Pt needs to make follow up appointment for 6 weeks. Gave number and address on dc paper. . Discussed pelvic rest for 6 weeks,. Discussed when, what and how to notify provider. Discussed baby blues, post partum depression and anxiety. denies depression at this time. discussed post partum warning signs. no questions. Voiced understanding.    [id number] pt discharged via wheelchair with belongings to front entrance

## 2021-05-02 NOTE — PROGRESS NOTES
2020: Rounded. Pt ambulated back to bed after using restroom. Steady gait noted. VS obtained and assessment completed. Clean cloth chux pad provided for bed and peripads given. Trash bags changed in room and bathroom. Pt states doing well and denies needing anything. 2111: Rounded. Pt sitting up in bed speaking with female visitor at bedside. [facility] brought [facility]. Saltines given per request.     2207: Rounded. Pt lying in bed using cell phone and states doing ok. Pt denies needing anything. 2316: Rounded. VS obtained. Fundus, lochia, and abdominal incision reassessed. Pt denies needing anything. 0111: Rounded. Pt c/o incisional pain and medicated with Motrin. See MAR; water pitcher refilled.    0306: Rounded. Pt lying in bed with eyes closed. RR even and unlabored. 0574: Rounded. Pt lying in bed with eyes closed and easily arousable when writer entered room. IV lock to right forearm flushed with 10 ml normal saline. Pt denies needing anything.

## 2021-05-02 NOTE — PROGRESS NOTES
8655: Rounded with shift report. Report given to JUAN Sanderson RN. Pt lying in bed with eyes closed. RR even and unlabored.

## 2021-12-01 LAB
ANTIBODY SCREEN, EXTERNAL: NEGATIVE
CHLAMYDIA, EXTERNAL: NEGATIVE
HIV, EXTERNAL: NEGATIVE
RUBELLA, EXTERNAL: NORMAL
TYPE, ABO & RH, EXTERNAL: NORMAL

## 2021-12-15 ENCOUNTER — HOSPITAL ENCOUNTER (OUTPATIENT)
Dept: PERINATAL CARE | Age: 26
Discharge: HOME | End: 2021-12-15
Attending: OBSTETRICS & GYNECOLOGY

## 2021-12-15 NOTE — PROGRESS NOTES
Indication: History (Previous pregnancy): Pregnancy complications: Ruptured membranes.  birth prior classical cs.   ____________________________________________________________________________  Consultation:  Type of Consultation: early gestation; hx  birth and hamzah demise  Consultant: Margaret Mota  Ms Santiago Daniels presents today for consultation regarding her history of  birth in prior gestation, which was delivered 2021. She is currently feeling well, 13 weeks pregnant. She reports at appros 2 am of , she awoke with right sided pain. She experienced SROM of bloody fluid. She used the bathroom to urinate and when she got back to bed, she passed clots. She presented to [facility], and records from that hospitalization were reviewed. Her couse was signficant for the following: (all  of the following was copied directly from her EMR):     EXAM:     Bowel Sounds: normal.   Inspection and Palpation: Soft, gravid, no tenderness, guarding, masses, rebound tenderness, or CVA tenderness and non-distended. Fetal heart tracin bpm baseline with moderate variability and spontaneous decelerations  Tocodynamometer: No contractions; contractions are palpable abdominally    Vagina: Copious blood clot  Cervix: 1 cm? Difficult to assess    Bedside ultrasound: Obvious oligohydramnios; transverse lie back down    24 Hour Results:    Recent Results     Recent Results (from the past 24 hour(s))  CBC WITH AUTOMATED DIFF   Collection Time: 21  4:30 AM  Result Value Ref Range   WBC 14.3    RBC 3.66 (L   HGB 11.2   HCT 35.3    MCV 96.4 FL   MCH 30.6  PG   MCHC 31.7  g/dL   RDW 12.6  %   PLATELET 331 K/uL   MPV 13.3  FL   NEUTROPHILS 69 %   LYMPHOCYTES 19  %   MONOCYTES 11  %   EOSINOPHILS 1 %   BASOPHILS 0  %   IMMATURE GRANULOCYTES 0  %   ABS. NEUTROPHILS 9.9  K/UL   ABS. LYMPHOCYTES 2.6 K/UL   ABS. MONOCYTES 1.5 K/UL   ABS. EOSINOPHILS 0.2  K/UL   ABS. BASOPHILS 0.0  K/UL   ABS. IMM. GRANS. 0. 1K/UL       Urine drug screen: POSITVE for opiates; methadone negative. All other ASH values were negative      Fetal fibronectin: POSITIVE    Assessment:  25w3d week gestation Spontaneous decelerations on external fetal monitor Vaginal bleeding and uterine contraction pattern suggestive of abruption    MEDS: she received 1 dose BMZ one hour prior to delivery       Procedures  AL  DELIVERY+POSTPARTUM CARE    Surgeon: Shankar Duncan MD    Assistant: none    Anesthesia: General     mL's    Urine output 50 mL's    Findings: Viable male infant in transverse presentation back down Minimal amniotic fluid 740 g  Drains: Walker    Specimens: Cord blood, cord gas, placenta    Complications: None    Surgical technique: 65-year-old black female  1 para 0 at 25 weeks, with prenatal care at [facility] for women who presented with abdominal pain, vaginal bleeding and findings suggestive of placental abruption. Anesthesia, nursing supervisor summoned. The patient was emergently taken to the operating room, where she was transferred to the OR table, simultaneously prepped, and a Walker catheter was placed. She was then draped and given general anesthesia. A 15 cm vertical incision was made from the uterine fundus down to the lower uterine segment. The fetus was noted to be in transverse presentation with the fetal head on the patient's right-hand side. The breech aspect of the infant was then brought into the incision the upper extremities were flexed and delivered, followed easily by the fetal head. Minimal amount of amniotic fluid was noted. The cord was clamped and cut and the infant was handed off to awaiting  intensive care unit nursing staff. Viable male infant. Pathology report (placenta):   173 gram placenta second trimester placenta:   19 cm, three-vessel umbilical cord, with decreased twisting. Marginal hematoma. No evidence of acute chorioamnionitis.  Intraplacental hemorrhage. Immature placental villous configuration. Her son passed away in the NICU on 2021. In summary: this patient likely sustained placental abruption (confirmed by placental pathology)following SROM, and  labor, and underwent a classical  delivery. This was not likely infection mediated  I spent 30 minutes with the patient counseling face to face, discussing the following: recurrence risk for spontaneous  birth following PPROM, cervical length monitoring and indication for cerclage, and use of progesterone for prevention of recurrent  birth, and an additional 15 minutes reviewing her electronic records, labs, ultrasounds and pathology. She had not reported the use of illicit substances, or prescription medications; given that this is a screening test, there is a false positive rate associated with it, and definitive testing results could not be located. Unfortunately the only risk factors that she has that cannot currently be modified is her short interpregnancy interval, which not only poses risk for  birth but also for sga. Given this, it would not be unreasonable to monitor fetal growth in the 3rd trimester. I have recommended she return in 4 weeks for early anatomic survey and to initiate cervical length monitoring. I would recommend the use of Sandy for risk reduction for  birth, althoug shared decision making should be employed regarding the use or lack thereof of IM progesterone (given recent post approval studies of efficacy) vs use of vaginal progesterone off-label. She is aware that she will require repeat  delivery this pregnancy due to hx classical hysterotomy.        Raul Haider MD [facility]  Perinatologist

## 2022-01-12 ENCOUNTER — HOSPITAL ENCOUNTER (OUTPATIENT)
Dept: PERINATAL CARE | Age: 27
Discharge: HOME | End: 2022-01-12
Attending: OBSTETRICS & GYNECOLOGY
Payer: COMMERCIAL

## 2022-01-12 PROCEDURE — 76811 OB US DETAILED SNGL FETUS: CPT | Performed by: OBSTETRICS & GYNECOLOGY

## 2022-01-26 ENCOUNTER — HOSPITAL ENCOUNTER (OUTPATIENT)
Dept: PERINATAL CARE | Age: 27
Discharge: HOME | End: 2022-01-26
Attending: OBSTETRICS & GYNECOLOGY
Payer: COMMERCIAL

## 2022-01-26 PROCEDURE — 76815 OB US LIMITED FETUS(S): CPT | Performed by: OBSTETRICS & GYNECOLOGY

## 2022-01-26 PROCEDURE — 76817 TRANSVAGINAL US OBSTETRIC: CPT | Performed by: OBSTETRICS & GYNECOLOGY

## 2022-02-09 ENCOUNTER — HOSPITAL ENCOUNTER (OUTPATIENT)
Dept: PERINATAL CARE | Age: 27
Discharge: HOME | End: 2022-02-09
Attending: OBSTETRICS & GYNECOLOGY
Payer: COMMERCIAL

## 2022-02-09 PROCEDURE — 76817 TRANSVAGINAL US OBSTETRIC: CPT | Performed by: OBSTETRICS & GYNECOLOGY

## 2022-02-09 PROCEDURE — 76816 OB US FOLLOW-UP PER FETUS: CPT | Performed by: OBSTETRICS & GYNECOLOGY

## 2022-02-10 ENCOUNTER — HOSPITAL ENCOUNTER (OUTPATIENT)
Dept: PREADMISSION TESTING | Age: 27
Discharge: HOME | End: 2022-02-10
Attending: OBSTETRICS & GYNECOLOGY
Payer: COMMERCIAL

## 2022-02-10 ENCOUNTER — PATIENT MESSAGE (OUTPATIENT)
Dept: OBGYN | Age: 27
End: 2022-02-10

## 2022-02-10 ENCOUNTER — TRANSCRIBE ORDER (OUTPATIENT)
Dept: REGISTRATION | Age: 27
End: 2022-02-10

## 2022-02-10 ENCOUNTER — TELEPHONE (OUTPATIENT)
Dept: PERINATAL CARE | Age: 27
End: 2022-02-10

## 2022-02-10 DIAGNOSIS — U07.1 COVID-19: Primary | ICD-10-CM

## 2022-02-10 DIAGNOSIS — U07.1 COVID-19: ICD-10-CM

## 2022-02-10 PROCEDURE — U0005 INFEC AGEN DETEC AMPLI PROBE: HCPCS

## 2022-02-10 NOTE — TELEPHONE ENCOUNTER
Per Dr Alireza Ramsey need to contact Dr Joanna Maldonado to request medication Seaview to be sent into pharmacy ASAP. Left message for Lida Fernandez to call back.

## 2022-02-11 ENCOUNTER — ANESTHESIA EVENT (OUTPATIENT)
Dept: LABOR AND DELIVERY | Age: 27
End: 2022-02-11
Payer: COMMERCIAL

## 2022-02-11 ENCOUNTER — HOSPITAL ENCOUNTER (OUTPATIENT)
Age: 27
Setting detail: OBSERVATION
Discharge: HOME | End: 2022-02-12
Attending: OBSTETRICS & GYNECOLOGY | Admitting: OBSTETRICS & GYNECOLOGY
Payer: COMMERCIAL

## 2022-02-11 ENCOUNTER — ANESTHESIA (OUTPATIENT)
Dept: LABOR AND DELIVERY | Age: 27
End: 2022-02-11
Payer: COMMERCIAL

## 2022-02-11 PROBLEM — O34.31 CERVICAL INSUFFICIENCY DURING PREGNANCY IN FIRST TRIMESTER, ANTEPARTUM: Status: ACTIVE | Noted: 2022-02-11

## 2022-02-11 PROBLEM — O34.32 CERVICAL INSUFFICIENCY DURING PREGNANCY IN SECOND TRIMESTER, ANTEPARTUM: Status: ACTIVE | Noted: 2022-02-11

## 2022-02-11 LAB
COVID-19 RAPID TEST, COVR: NOT DETECTED
ERYTHROCYTE [DISTWIDTH] IN BLOOD BY AUTOMATED COUNT: 17 % (ref 11.5–14.5)
ERYTHROCYTE [DISTWIDTH] IN BLOOD BY AUTOMATED COUNT: 17.1 % (ref 11.5–14.5)
HCT VFR BLD AUTO: 22.4 % (ref 35–47)
HCT VFR BLD AUTO: 26.9 % (ref 35–47)
HGB BLD-MCNC: 6.5 G/DL (ref 11.5–16)
HGB BLD-MCNC: 7.8 G/DL (ref 11.5–16)
IRON SATN MFR SERPL: 4 % (ref 20–50)
IRON SERPL-MCNC: 18 UG/DL (ref 35–150)
MCH RBC QN AUTO: 24.1 PG (ref 26–34)
MCH RBC QN AUTO: 24.3 PG (ref 26–34)
MCHC RBC AUTO-ENTMCNC: 29 G/DL (ref 30–36.5)
MCHC RBC AUTO-ENTMCNC: 29 G/DL (ref 30–36.5)
MCV RBC AUTO: 83 FL (ref 80–99)
MCV RBC AUTO: 83.6 FL (ref 80–99)
NRBC # BLD: 0 K/UL (ref 0–0.01)
NRBC # BLD: 0.02 K/UL (ref 0–0.01)
NRBC BLD-RTO: 0 PER 100 WBC
NRBC BLD-RTO: 0.2 PER 100 WBC
PLATELET # BLD AUTO: 174 K/UL (ref 150–400)
PLATELET # BLD AUTO: 206 K/UL (ref 150–400)
PMV BLD AUTO: 12.3 FL (ref 8.9–12.9)
PMV BLD AUTO: 12.5 FL (ref 8.9–12.9)
RBC # BLD AUTO: 2.68 M/UL (ref 3.8–5.2)
RBC # BLD AUTO: 3.24 M/UL (ref 3.8–5.2)
SARS-COV-2, COV2: NORMAL
SARS-COV-2, XPLCVT: NOT DETECTED
SOURCE, COVRS: NORMAL
SOURCE, COVRS: NORMAL
TIBC SERPL-MCNC: 442 UG/DL (ref 250–450)
WBC # BLD AUTO: 10.9 K/UL (ref 3.6–11)
WBC # BLD AUTO: 12.7 K/UL (ref 3.6–11)

## 2022-02-11 PROCEDURE — 75410000003 HC RECOV DEL/VAG/CSECN EA 0.5 HR: Performed by: OBSTETRICS & GYNECOLOGY

## 2022-02-11 PROCEDURE — 87635 SARS-COV-2 COVID-19 AMP PRB: CPT

## 2022-02-11 PROCEDURE — G0378 HOSPITAL OBSERVATION PER HR: HCPCS

## 2022-02-11 PROCEDURE — 77030007866 HC KT SPN ANES BBMI -B: Performed by: ANESTHESIOLOGY

## 2022-02-11 PROCEDURE — 74011250637 HC RX REV CODE- 250/637: Performed by: OBSTETRICS & GYNECOLOGY

## 2022-02-11 PROCEDURE — 76060000078 HC EPIDURAL ANESTHESIA: Performed by: OBSTETRICS & GYNECOLOGY

## 2022-02-11 PROCEDURE — 83540 ASSAY OF IRON: CPT

## 2022-02-11 PROCEDURE — 85027 COMPLETE CBC AUTOMATED: CPT

## 2022-02-11 PROCEDURE — 74011000250 HC RX REV CODE- 250: Performed by: NURSE ANESTHETIST, CERTIFIED REGISTERED

## 2022-02-11 PROCEDURE — 74011000250 HC RX REV CODE- 250: Performed by: OBSTETRICS & GYNECOLOGY

## 2022-02-11 PROCEDURE — 36415 COLL VENOUS BLD VENIPUNCTURE: CPT

## 2022-02-11 PROCEDURE — 83020 HEMOGLOBIN ELECTROPHORESIS: CPT

## 2022-02-11 PROCEDURE — 51701 INSERT BLADDER CATHETER: CPT

## 2022-02-11 PROCEDURE — 99238 HOSP IP/OBS DSCHRG MGMT 30/<: CPT | Performed by: OBSTETRICS & GYNECOLOGY

## 2022-02-11 PROCEDURE — 76010000389 HC LDRP PROCEDURE 0.5 TO 1 HR: Performed by: OBSTETRICS & GYNECOLOGY

## 2022-02-11 PROCEDURE — 59320 REVISION OF CERVIX: CPT | Performed by: OBSTETRICS & GYNECOLOGY

## 2022-02-11 PROCEDURE — 74011250636 HC RX REV CODE- 250/636: Performed by: NURSE ANESTHETIST, CERTIFIED REGISTERED

## 2022-02-11 RX ORDER — BUPIVACAINE HYDROCHLORIDE 7.5 MG/ML
INJECTION, SOLUTION EPIDURAL; RETROBULBAR AS NEEDED
Status: DISCONTINUED | OUTPATIENT
Start: 2022-02-11 | End: 2022-02-11 | Stop reason: HOSPADM

## 2022-02-11 RX ORDER — ONDANSETRON 2 MG/ML
INJECTION INTRAMUSCULAR; INTRAVENOUS AS NEEDED
Status: DISCONTINUED | OUTPATIENT
Start: 2022-02-11 | End: 2022-02-11

## 2022-02-11 RX ORDER — INDOMETHACIN 25 MG/1
25 CAPSULE ORAL EVERY 6 HOURS
Status: DISCONTINUED | OUTPATIENT
Start: 2022-02-11 | End: 2022-02-12 | Stop reason: HOSPADM

## 2022-02-11 RX ORDER — SODIUM CHLORIDE, SODIUM LACTATE, POTASSIUM CHLORIDE, CALCIUM CHLORIDE 600; 310; 30; 20 MG/100ML; MG/100ML; MG/100ML; MG/100ML
125 INJECTION, SOLUTION INTRAVENOUS CONTINUOUS
Status: DISCONTINUED | OUTPATIENT
Start: 2022-02-11 | End: 2022-02-12 | Stop reason: HOSPADM

## 2022-02-11 RX ORDER — SODIUM CHLORIDE, SODIUM LACTATE, POTASSIUM CHLORIDE, CALCIUM CHLORIDE 600; 310; 30; 20 MG/100ML; MG/100ML; MG/100ML; MG/100ML
INJECTION, SOLUTION INTRAVENOUS
Status: DISCONTINUED | OUTPATIENT
Start: 2022-02-11 | End: 2022-02-11 | Stop reason: HOSPADM

## 2022-02-11 RX ORDER — SODIUM CHLORIDE 0.9 % (FLUSH) 0.9 %
5-40 SYRINGE (ML) INJECTION AS NEEDED
Status: DISCONTINUED | OUTPATIENT
Start: 2022-02-11 | End: 2022-02-12 | Stop reason: HOSPADM

## 2022-02-11 RX ORDER — SODIUM CHLORIDE 0.9 % (FLUSH) 0.9 %
5-40 SYRINGE (ML) INJECTION EVERY 8 HOURS
Status: DISCONTINUED | OUTPATIENT
Start: 2022-02-11 | End: 2022-02-12 | Stop reason: HOSPADM

## 2022-02-11 RX ADMIN — SODIUM CHLORIDE, POTASSIUM CHLORIDE, SODIUM LACTATE AND CALCIUM CHLORIDE: 600; 310; 30; 20 INJECTION, SOLUTION INTRAVENOUS at 09:05

## 2022-02-11 RX ADMIN — SODIUM CHLORIDE, POTASSIUM CHLORIDE, SODIUM LACTATE AND CALCIUM CHLORIDE: 600; 310; 30; 20 INJECTION, SOLUTION INTRAVENOUS at 08:34

## 2022-02-11 RX ADMIN — SODIUM CHLORIDE, PRESERVATIVE FREE 10 ML: 5 INJECTION INTRAVENOUS at 13:15

## 2022-02-11 RX ADMIN — INDOMETHACIN 25 MG: 25 CAPSULE ORAL at 10:05

## 2022-02-11 RX ADMIN — INDOMETHACIN 25 MG: 25 CAPSULE ORAL at 17:45

## 2022-02-11 RX ADMIN — SODIUM CHLORIDE, PRESERVATIVE FREE 10 ML: 5 INJECTION INTRAVENOUS at 17:45

## 2022-02-11 RX ADMIN — BUPIVACAINE HYDROCHLORIDE 8.5 MG: 7.5 INJECTION, SOLUTION EPIDURAL; RETROBULBAR at 08:46

## 2022-02-11 NOTE — H&P
MATERNAL FETAL MEDICINE  INPATIENT CONSULTATION    REQUESTED BY: Anne Addison MD   DATE OF CONSULT: 22    REASON FOR CONSULTATION: short cervix/hx PPROM    Marcelle Styles is a 27 y.o. female  at 24w2d. HPI  Casandra Alvarado is a 21yo  s/p consult with Dr. Checo Barrera 12/15/21 re: history of PPROM/abruption/classical  cearean/demise at 25w3d on 21. She still hasn't started [facility] yet. There was a thickened nuchal  fold at 6.13mm seen @ her 17-wk u/s. Due to the short pregnancy interval, she will need serial growth  scans after viability. cffDNA testing was arranged last visit (22) and was low-risk. Patient Active Problem List   Diagnosis Code    Cervical insufficiency during pregnancy in first trimester, antepartum O34.31       Past Medical History:   Diagnosis Date    Bacterial vaginosis 2021    Trichomonal vaginitis 2021       No past surgical history on file. OB History    Para Term  AB Living   2 1 0 1 0 1   SAB IAB Ectopic Molar Multiple Live Births   0 0 0 0 0 1       No Known Allergies    No current facility-administered medications for this encounter. Social History     Tobacco Use    Smoking status: Former Smoker    Smokeless tobacco: Never Used   Substance Use Topics    Alcohol use: Not Currently    Drug use: Never       No family history on file. ROS    Visit Vitals  BP (!) 98/59   Pulse 78   Temp 98.7 °F (37.1 °C)   Resp 16   LMP 2020 (LMP Unknown)   SpO2 99%       Gen: Comfortable, NAD  Resp: Comfortable respirations  CV: Well perfused  Abd: Gravid, NT, ND  Ext: Moving all extremities well  Neuro: Alert, interactive, appropriate    No results found for this or any previous visit (from the past 24 hour(s)). ULTRASOUND:  22: Single viable IUP with biometry consistent with dates. Bilateral renal pylectasis R=5.2cm and L=4.6mm is  seen today. There is normal fetal movment and amniotic fluid. Cardiac activity is observed.   There is normal fluid and placenta is posterior. A transvaginal ultrasound was performed for cervical length. The shortest best cervical length is 1.62 cm  with funnelling. ASSESSMENT:    27 y.o. female  at 24w2d with a short cervix/hx PPROM. PLAN:     Cerclage today    The patient visit was approximately 30 minutes with greater than half spent in face-to face counseling and coordination of care.

## 2022-02-11 NOTE — ANESTHESIA POSTPROCEDURE EVALUATION
Post-Anesthesia Evaluation and Assessment    Patient: Suellen Santizo MRN: [id number]  SSN: [id number]    YOB: 1995  Age: 27 y.o. Sex: female      I have evaluated the patient and they are stable and ready for discharge from the PACU. Cardiovascular Function/Vital Signs  Visit Vitals  BP (!) 99/58   Pulse 89   Temp 36.1 °C (97 °F)   Resp 15   SpO2 100%       Patient is status post Spinal anesthesia for Procedure(s):  CERCLAGE. Nausea/Vomiting: None    Postoperative hydration reviewed and adequate. Pain:      Managed    Neurological Status: At baseline    Mental Status, Level of Consciousness: Alert and  oriented to person, place, and time    Pulmonary Status:   O2 Device: None (02/11/22 0915)   Adequate oxygenation and airway patent    Complications related to anesthesia: None    Post-anesthesia assessment completed. No concerns    Signed By: Dane Pina MD     February 11, 2022              Procedure(s):  CERCLAGE.    spinal    <BSHSIANPOST>    INITIAL Post-op Vital signs: No vitals data found for the desired time range.

## 2022-02-11 NOTE — PROGRESS NOTES
0740 Verbal shift change report received from Marixa Martinez RN   nurse. Report included the following information SBAR.     0800 Rapid COVID 19 obtained and to lab  0820 Lactated ringers IVF bolus started  0830 FHT's 165  0839 To OR # 2 for cerclage  5277 Returned to LDR #12 for PACU recovery after cerclage, denies abdominal pain, cramping or tightening. No vaginal bleeding noted, no leaking of fluid from vagina. 1127 Dr. Julia Alcazar notified of uterine contractions, orders received  1005 Indocin 25 mg PO given, denies feeling abdominal cramping, tightening or contractions, no vaginal bleeding note at this time  1230 Dr. Julia Alcazar at bedside talking with patient, encouraging patient to remain in hospital overnight for continued observation and rest  1315 IV saline locked, up to void without difficulty. Small amount of blood noted on peripad, Pad changed,will continue to observe. Returned to bed and instructed to call for assistance before attempting to get out of bed, call bell within reach. 1400 Lunch served  1420 Vital signs stable, afebrile, denies feeling abdominal cramping, tightening or contractions  1430 TRANSFER - OUT REPORT:    Verbal report given to Jeri Pompa RN on Julieth Fish  being transferred to room [address] on [address]unit) for routine progression of care       Report consisted of patients Situation, Background, Assessment and   Recommendations(SBAR). Information from the following report(s) SBAR, Intake/Output, MAR and Recent Results was reviewed with the receiving nurse. Lines:   Saline Lock 02/11/22 Left;Posterior Forearm (Active)   Site Assessment Clean, dry, & intact 02/11/22 1401   Phlebitis Assessment 0 02/11/22 1401   Infiltration Assessment 0 02/11/22 1401   Dressing Status Clean, dry, & intact 02/11/22 1401   Dressing Type Transparent;Tape 02/11/22 1401   Hub Color/Line Status Pink 02/11/22 1401        Opportunity for questions and clarification was provided.       Patient transported via wheel chair with:  Registered Nurse

## 2022-02-11 NOTE — ADDENDUM NOTE
Addendum  created 02/11/22 1101 by Gilbert Mendez MD    Child order released for a procedure order, Clinical Note Signed, Intraprocedure Blocks edited

## 2022-02-11 NOTE — OP NOTES
Operative Note     Patient: Vinicius Rich  :   1995  Medical Record Number: [id number]     Pre-operative Diagnosis:   Cervical Insufficiency    Post-operative Diagnosis:   Same    Procedure:   Rescue cerclage (Garcia technique)         Surgeon:   Tung Gunn    EBL: see anesthesia record  Complications: None     Description:  After appropriate consent was obtained, the patient was taken to the OR. Spinal anesthesia was placed per the anesthesia service. The patient was then placed in the dorsolithotomy position and prepped and draped in usual fashion. The bladder was emptied with in and out catheterization. The cervix was noted to be 0.5cm in length and 1cm dilated with membranes visible at 0.5cm from the external os. Following this, the patient was placed in the Trendelenburg position retractors were placed in the vagina and the cervix was grasped with ring forceps at the 12 o'clock and 6 o'clock position at approximately 0.25cm onto the cervix to avoid the bulging membranes. Working in a counter clockwise fashion, Garcia cerclage was placed using Ethibond suture. The knot was tied at 12 o'clock. The vagina was swabbed with a sponge stick. Instruments were then removed from the vagina. All sponge, needle and instrument counts were correct. The patient was then placed in a supine position and transferred to the recovery room in good condition.

## 2022-02-11 NOTE — ANESTHESIA PROCEDURE NOTES
Spinal Block    Start time: 2/11/2022 8:42 AM  End time: 2/11/2022 8:46 AM  Performed by: Elizabeth Rivera MD  Authorized by: Elizabeth Rivera MD     Pre-procedure:   Indications: primary anesthetic  Preanesthetic Checklist: patient identified, risks and benefits discussed, anesthesia consent, site marked, patient being monitored and timeout performed      Spinal Block:   Patient Position:  Seated    Prep: Betadine      Location:  L3-4  Technique:  Single shot        Needle:   Needle Type:  Pencil-tip  Needle Gauge:  25 G  Attempts:  1      Events: CSF confirmed, no blood with aspiration and no paresthesia        Assessment:  Insertion:  Uncomplicated  Patient tolerance:  Patient tolerated the procedure well with no immediate complications

## 2022-02-11 NOTE — PROGRESS NOTES
Patient resting comfortably, discussed the procedure with the patient and that she is still at risk for PPROM even though her surgery went well. Reviewed modified bedrest and pelvic rest recommendations as well as the recommendation for an early MFM consult for a prophylactic cerclage with her next pregnancy. All questions answered and she voiced understanding.

## 2022-02-11 NOTE — ANESTHESIA PREPROCEDURE EVALUATION
Relevant Problems   No relevant active problems       Anesthetic History   No history of anesthetic complications            Review of Systems / Medical History  Patient summary reviewed, nursing notes reviewed and pertinent labs reviewed    Pulmonary  Within defined limits                 Neuro/Psych   Within defined limits           Cardiovascular  Within defined limits                     GI/Hepatic/Renal  Within defined limits              Endo/Other  Within defined limits           Other Findings              Physical Exam    Airway  Mallampati: I  TM Distance: 4 - 6 cm  Neck ROM: normal range of motion   Mouth opening: Normal     Cardiovascular  Regular rate and rhythm,  S1 and S2 normal,  no murmur, click, rub, or gallop             Dental  No notable dental hx       Pulmonary  Breath sounds clear to auscultation               Abdominal  GI exam deferred       Other Findings            Anesthetic Plan    ASA: 2  Anesthesia type: spinal          Induction: Intravenous  Anesthetic plan and risks discussed with: Patient

## 2022-02-12 VITALS
HEART RATE: 78 BPM | TEMPERATURE: 98.2 F | DIASTOLIC BLOOD PRESSURE: 63 MMHG | OXYGEN SATURATION: 95 % | RESPIRATION RATE: 14 BRPM | SYSTOLIC BLOOD PRESSURE: 105 MMHG

## 2022-02-12 PROCEDURE — G0378 HOSPITAL OBSERVATION PER HR: HCPCS

## 2022-02-12 PROCEDURE — 74011250637 HC RX REV CODE- 250/637: Performed by: OBSTETRICS & GYNECOLOGY

## 2022-02-12 RX ORDER — INDOMETHACIN 25 MG/1
25 CAPSULE ORAL EVERY 6 HOURS
Qty: 4 CAPSULE | Refills: 0 | Status: SHIPPED | OUTPATIENT
Start: 2022-02-12 | End: 2022-02-13

## 2022-02-12 RX ADMIN — INDOMETHACIN 25 MG: 25 CAPSULE ORAL at 00:03

## 2022-02-12 RX ADMIN — INDOMETHACIN 25 MG: 25 CAPSULE ORAL at 06:23

## 2022-02-12 NOTE — PROGRESS NOTES
High Risk Obstetrics Progress Note    Name: Landy Osgood MRN: [id number]  SSN: [id number]    YOB: 1995  Age: 27 y.o. Sex: female      Subjective:      LOS: 1 day    Estimated Date of Delivery: 22   Gestational Age Today: 21w5d     Patient admitted for overnight observation s/p rescue cerclage. States she has no ctx/vb/lof. Normal FM. Objective:     Vitals:  Blood pressure 105/63, pulse 78, temperature 98.2 °F (36.8 °C), resp. rate 14, last menstrual period 2020, SpO2 95 %, unknown if currently breastfeeding. Temp (24hrs), Av.2 °F (36.8 °C), Min:97 °F (36.1 °C), Max:98.6 °F (37 °C)    Systolic (25TRY), VRJ:290 , Min:86 , XOC:402      Diastolic (26XUC), MVJ:90, Min:42, Max:93       Intake and Output:         Physical Exam:  Patient without distress.   Abdomen: soft, nontender  Fundus: soft and non tender  Lower Extremities:  - Edema No       Membranes:  Intact    Uterine Activity:  None    Fetal Heart Rate:  +FHT        Labs:   Recent Results (from the past 36 hour(s))   CBC W/O DIFF    Collection Time: 22  6:42 AM   Result Value Ref Range    WBC 10.9 3.6 - 11.0 K/uL    RBC 3.24 (L) 3.80 - 5.20 M/uL    HGB 7.8 (L) 11.5 - 16.0 g/dL    HCT 26.9 (L) 35.0 - 47.0 %    MCV 83.0 80.0 - 99.0 FL    MCH 24.1 (L) 26.0 - 34.0 PG    MCHC 29.0 (L) 30.0 - 36.5 g/dL    RDW 17.0 (H) 11.5 - 14.5 %    PLATELET 304 825 - 921 K/uL    MPV 12.5 8.9 - 12.9 FL    NRBC 0.0 0  WBC    ABSOLUTE NRBC 0.00 0.00 - 0.01 K/uL   SARS-COV-2    Collection Time: 22  8:01 AM   Result Value Ref Range    SARS-CoV-2 Please find results under separate order     COVID-19 RAPID TEST    Collection Time: 22  8:01 AM   Result Value Ref Range    Specimen source Nasopharyngeal      COVID-19 rapid test Not detected NOTD     IRON PROFILE    Collection Time: 22  3:23 PM   Result Value Ref Range    Iron 18 (L) 35 - 150 ug/dL    TIBC 442 250 - 450 ug/dL    Iron % saturation 4 (L) 20 - 50 %   CBC W/O DIFF Collection Time: 22  3:23 PM   Result Value Ref Range    WBC 12.7 (H) 3.6 - 11.0 K/uL    RBC 2.68 (L) 3.80 - 5.20 M/uL    HGB 6.5 (L) 11.5 - 16.0 g/dL    HCT 22.4 (L) 35.0 - 47.0 %    MCV 83.6 80.0 - 99.0 FL    MCH 24.3 (L) 26.0 - 34.0 PG    MCHC 29.0 (L) 30.0 - 36.5 g/dL    RDW 17.1 (H) 11.5 - 14.5 %    PLATELET 968 302 - 982 K/uL    MPV 12.3 8.9 - 12.9 FL    NRBC 0.2 (H) 0  WBC    ABSOLUTE NRBC 0.02 (H) 0.00 - 0.01 K/uL       Assessment and Plan: Active Problems:    Cervical insufficiency during pregnancy in second trimester, antepartum (2022)        at 21w5d s/p rescue cerclage without evidence of PPROM/PTL. Plan for discharge home with additional 24 hours of indocin. Spoke with Dr Isabell Mac this morning re: this plan. Pt to f/u with MFM in 1 week. Pt also needs to initiate [place] immediately-will facilitate this through our office on Monday.

## 2022-02-12 NOTE — PROGRESS NOTES
3879 Verbal shift change report given to Jackie Walton RN (oncoming nurse) by Namita Santiago RN (offgoing nurse). Report included the following information SBAR and Kardex. 1100 Discharge instructions reviewed with patient. No questions at this time.

## 2022-02-12 NOTE — DISCHARGE SUMMARY
Obstetrical Discharge Summary     Name: Zayda Olson MRN: [id number]  SSN: [id number]    YOB: 1995  Age: 27 y.o. Sex: female      Allergies: Patient has no known allergies. Admit Date: 2/11/2022    Discharge Date: 2/12/2022     Admitting Physician: Janusz Valderrama MD     Attending Physician:  Dorna Severs, MD     * Admission Diagnoses: Cervical insufficiency during pregnancy in first trimester, antepartum [O34.31]    * Discharge Diagnoses: This patient has no babies on file. Additional Diagnoses:   Hospital Problems as of 2/12/2022 Date Reviewed: 2/21/2021          Codes Class Noted - Resolved POA    Cervical insufficiency during pregnancy in second trimester, antepartum ICD-10-CM: O34.32  ICD-9-CM: 654.53  2/11/2022 - Present Unknown             Lab Results   Component Value Date/Time    ABO/Rh(D) O Positive 04/30/2021 04:30 AM    Rubella, External Immunne 12/01/2021 12:00 AM    ABO,Rh O Positive 12/01/2021 12:00 AM    There is no immunization history for the selected administration types on file for this patient. * Procedures:   Procedure(s):  CERCLAGE           * Discharge Condition: [facility]: overnight observation on 48 hours of indomethacin (inpatient and then as outpatient)    * Disposition: Home    Discharge Medications:   Current Discharge Medication List      START taking these medications    Details   indomethacin (INDOCIN) 25 mg capsule Take 1 Capsule by mouth every six (6) hours for 4 doses. Qty: 4 Capsule, Refills: 0  Start date: 2/12/2022, End date: 2/13/2022         STOP taking these medications       ibuprofen (MOTRIN) 800 mg tablet Comments:   Reason for Stopping:               * Follow-up Care/Patient Instructions: Activity: no sex/tampons/baths/swimming/hot tubs. No heavier lifting than 10 pounds. No strenuous exercise outside of walking.    Diet: Regular Diet  Wound Care: n/a    Follow-up Information     Follow up With Specialties Details Why Sacha Duncan MD Obstetrics & Gynecology Call on 2/18/2022  [facility]  [phone]      Jojo Vasquez MD Obstetrics & Gynecology, Gynecology, Obstetrics Call in 2 weeks  [address]  [phone]

## 2022-02-12 NOTE — DISCHARGE INSTRUCTIONS
Patient Education        Cervical Cerclage to Prevent  Delivery: What to Expect at Home  Your Recovery  Cervical cerclage (say \"SER-vuh-kul ser-KLAZH\") is a procedure that helps keep your cervix from opening too soon. Your doctor has sewn your cervix shut to help prevent  labor. For the next few days, you may have:  · Cramping. · Spotting. · Pain when you urinate. Your doctor may give you instructions on when you can do your normal activities again, such as driving and going back to work. Your doctor will remove the stitches around your cervix at 37 weeks, or if you go into  labor or show signs of infection. This care sheet gives you a general idea about how long it will take for you to recover. But each person recovers at a different pace. Follow the steps below to get better as quickly as possible. How can you care for yourself at home? Activity    · Rest when you feel tired.     · Ask your doctor when it is okay for you to have sex. Medicines    · Be safe with medicines. Read and follow all instructions on the label.     · If you are not taking a prescription pain medicine, ask your doctor if you can take an over-the-counter medicine.     · Your doctor will tell you if and when you can restart your medicines. He or she will also give you instructions about taking any new medicines. Follow-up care is a key part of your treatment and safety. Be sure to make and go to all appointments, and call your doctor if you are having problems. It's also a good idea to know your test results and keep a list of the medicines you take. When should you call for help? Call 911 anytime you think you may need emergency care. For example, call if:    · You have severe trouble breathing.     · You have sudden, severe pain in your belly.     · You have severe vaginal bleeding.    Call your doctor now or seek immediate medical care if:    · You have new pelvic pain, or the pain in your pelvis gets worse.     · You have a new discharge from your vagina.     · You have a new or higher fever. Watch closely for changes in your health, and be sure to contact your doctor if you have any problems. Where can you learn more? Go to http://www.gray.com/  Enter M403 in the search box to learn more about \"Cervical Cerclage to Prevent  Delivery: What to Expect at Home. \"  Current as of: 2021               Content Version: 13.0   [employer]. Care instructions adapted under license by [employer] (which disclaims liability or warranty for this information). If you have questions about a medical condition or this instruction, always ask your healthcare professional. [address] any warranty or liability for your use of this information.

## 2022-02-15 LAB
HGB A MFR BLD: 97.8 % (ref 96.4–98.8)
HGB A2 MFR BLD COLUMN CHROM: 2.2 % (ref 1.8–3.2)
HGB F MFR BLD: 0 % (ref 0–2)
HGB FRACT BLD-IMP: NORMAL
HGB S MFR BLD: 0 %

## 2022-02-18 ENCOUNTER — HOSPITAL ENCOUNTER (OUTPATIENT)
Age: 27
Setting detail: OBSERVATION
Discharge: ANOTHER HOSPITAL | End: 2022-02-19
Attending: OBSTETRICS & GYNECOLOGY | Admitting: OBSTETRICS & GYNECOLOGY
Payer: COMMERCIAL

## 2022-02-18 ENCOUNTER — HOSPITAL ENCOUNTER (OUTPATIENT)
Dept: PERINATAL CARE | Age: 27
Discharge: HOME | End: 2022-02-18
Attending: OBSTETRICS & GYNECOLOGY
Payer: MEDICAID

## 2022-02-18 PROBLEM — Z34.90 PREGNANT AND NOT YET DELIVERED: Status: ACTIVE | Noted: 2022-02-18

## 2022-02-18 PROBLEM — Z3A.22 22 WEEKS GESTATION OF PREGNANCY: Status: ACTIVE | Noted: 2022-02-18

## 2022-02-18 PROBLEM — O42.90 AMNIOTIC FLUID LEAKING: Status: ACTIVE | Noted: 2022-02-18

## 2022-02-18 LAB
AMPHET UR QL SCN: NEGATIVE
APPEARANCE UR: CLEAR
BACTERIA URNS QL MICRO: NEGATIVE /HPF
BARBITURATES UR QL SCN: NEGATIVE
BASOPHILS # BLD: 0 K/UL (ref 0–0.1)
BASOPHILS NFR BLD: 0 % (ref 0–1)
BENZODIAZ UR QL: NEGATIVE
BILIRUB UR QL: NEGATIVE
CANNABINOIDS UR QL SCN: NEGATIVE
COCAINE UR QL SCN: NEGATIVE
COLOR UR: ABNORMAL
COVID-19 RAPID TEST, COVR: NOT DETECTED
DIFFERENTIAL METHOD BLD: ABNORMAL
DRUG SCRN COMMENT,DRGCM: NORMAL
EOSINOPHIL # BLD: 0.1 K/UL (ref 0–0.4)
EOSINOPHIL NFR BLD: 1 % (ref 0–7)
ERYTHROCYTE [DISTWIDTH] IN BLOOD BY AUTOMATED COUNT: 17.3 % (ref 11.5–14.5)
GLUCOSE UR STRIP.AUTO-MCNC: NEGATIVE MG/DL
HCT VFR BLD AUTO: 24.8 % (ref 35–47)
HGB BLD-MCNC: 7.2 G/DL (ref 11.5–16)
HGB UR QL STRIP: ABNORMAL
IMM GRANULOCYTES # BLD AUTO: 0.1 K/UL (ref 0–0.04)
IMM GRANULOCYTES NFR BLD AUTO: 1 % (ref 0–0.5)
KETONES UR QL STRIP.AUTO: NEGATIVE MG/DL
LEUKOCYTE ESTERASE UR QL STRIP.AUTO: NEGATIVE
LYMPHOCYTES # BLD: 1.8 K/UL (ref 0.8–3.5)
LYMPHOCYTES NFR BLD: 14 % (ref 12–49)
MCH RBC QN AUTO: 23.8 PG (ref 26–34)
MCHC RBC AUTO-ENTMCNC: 29 G/DL (ref 30–36.5)
MCV RBC AUTO: 82.1 FL (ref 80–99)
METHADONE UR QL: NEGATIVE
MONOCYTES # BLD: 1.2 K/UL (ref 0–1)
MONOCYTES NFR BLD: 10 % (ref 5–13)
MUCOUS THREADS URNS QL MICRO: ABNORMAL /LPF
NEUTS SEG # BLD: 9.1 K/UL (ref 1.8–8)
NEUTS SEG NFR BLD: 74 % (ref 32–75)
NITRITE UR QL STRIP.AUTO: NEGATIVE
NRBC # BLD: 0 K/UL (ref 0–0.01)
NRBC BLD-RTO: 0 PER 100 WBC
OPIATES UR QL: NEGATIVE
PCP UR QL: NEGATIVE
PH UR STRIP: 6 [PH] (ref 5–8)
PLATELET # BLD AUTO: 218 K/UL (ref 150–400)
PMV BLD AUTO: 13.2 FL (ref 8.9–12.9)
PROT UR STRIP-MCNC: 100 MG/DL
RBC # BLD AUTO: 3.02 M/UL (ref 3.8–5.2)
RBC #/AREA URNS HPF: >100 /HPF (ref 0–5)
SP GR UR REFRACTOMETRY: 1.01 (ref 1–1.03)
SPECIMEN SOURCE: NORMAL
SURFACTANT/ALB AMN: POSITIVE MG/G
UROBILINOGEN UR QL STRIP.AUTO: 0.1 EU/DL (ref 0.1–1)
WBC # BLD AUTO: 12.2 K/UL (ref 3.6–11)
WBC URNS QL MICRO: ABNORMAL /HPF (ref 0–4)

## 2022-02-18 PROCEDURE — 87086 URINE CULTURE/COLONY COUNT: CPT

## 2022-02-18 PROCEDURE — 96375 TX/PRO/DX INJ NEW DRUG ADDON: CPT

## 2022-02-18 PROCEDURE — 96372 THER/PROPH/DIAG INJ SC/IM: CPT

## 2022-02-18 PROCEDURE — 36415 COLL VENOUS BLD VENIPUNCTURE: CPT

## 2022-02-18 PROCEDURE — G0378 HOSPITAL OBSERVATION PER HR: HCPCS

## 2022-02-18 PROCEDURE — 76815 OB US LIMITED FETUS(S): CPT | Performed by: OBSTETRICS & GYNECOLOGY

## 2022-02-18 PROCEDURE — 76817 TRANSVAGINAL US OBSTETRIC: CPT | Performed by: OBSTETRICS & GYNECOLOGY

## 2022-02-18 PROCEDURE — 74011250636 HC RX REV CODE- 250/636

## 2022-02-18 PROCEDURE — 74011250636 HC RX REV CODE- 250/636: Performed by: OBSTETRICS & GYNECOLOGY

## 2022-02-18 PROCEDURE — 99218 PR INITIAL OBSERVATION CARE/DAY 30 MINUTES: CPT | Performed by: OBSTETRICS & GYNECOLOGY

## 2022-02-18 PROCEDURE — 75810000275 HC EMERGENCY DEPT VISIT NO LEVEL OF CARE

## 2022-02-18 PROCEDURE — 80307 DRUG TEST PRSMV CHEM ANLYZR: CPT

## 2022-02-18 PROCEDURE — 85025 COMPLETE CBC W/AUTO DIFF WBC: CPT

## 2022-02-18 PROCEDURE — 81001 URINALYSIS AUTO W/SCOPE: CPT

## 2022-02-18 PROCEDURE — 87635 SARS-COV-2 COVID-19 AMP PRB: CPT

## 2022-02-18 PROCEDURE — 96374 THER/PROPH/DIAG INJ IV PUSH: CPT

## 2022-02-18 PROCEDURE — 84112 EVAL AMNIOTIC FLUID PROTEIN: CPT

## 2022-02-18 PROCEDURE — 74011000250 HC RX REV CODE- 250: Performed by: OBSTETRICS & GYNECOLOGY

## 2022-02-18 RX ORDER — SODIUM CHLORIDE, SODIUM LACTATE, POTASSIUM CHLORIDE, CALCIUM CHLORIDE 600; 310; 30; 20 MG/100ML; MG/100ML; MG/100ML; MG/100ML
75 INJECTION, SOLUTION INTRAVENOUS CONTINUOUS
Status: DISCONTINUED | OUTPATIENT
Start: 2022-02-18 | End: 2022-02-19 | Stop reason: HOSPADM

## 2022-02-18 RX ORDER — CALCIUM GLUCONATE 20 MG/ML
1 INJECTION, SOLUTION INTRAVENOUS ONCE
Status: DISCONTINUED | OUTPATIENT
Start: 2022-02-18 | End: 2022-02-19 | Stop reason: HOSPADM

## 2022-02-18 RX ORDER — MAGNESIUM SULFATE HEPTAHYDRATE 40 MG/ML
1 INJECTION, SOLUTION INTRAVENOUS CONTINUOUS
Status: DISCONTINUED | OUTPATIENT
Start: 2022-02-18 | End: 2022-02-19 | Stop reason: HOSPADM

## 2022-02-18 RX ORDER — BETAMETHASONE SODIUM PHOSPHATE AND BETAMETHASONE ACETATE 3; 3 MG/ML; MG/ML
12 INJECTION, SUSPENSION INTRA-ARTICULAR; INTRALESIONAL; INTRAMUSCULAR; SOFT TISSUE ONCE
Status: COMPLETED | OUTPATIENT
Start: 2022-02-18 | End: 2022-02-18

## 2022-02-18 RX ORDER — MAGNESIUM SULFATE HEPTAHYDRATE 40 MG/ML
2 INJECTION, SOLUTION INTRAVENOUS ONCE
Status: DISCONTINUED | OUTPATIENT
Start: 2022-02-18 | End: 2022-02-18

## 2022-02-18 RX ORDER — MAGNESIUM SULFATE HEPTAHYDRATE 40 MG/ML
INJECTION, SOLUTION INTRAVENOUS
Status: COMPLETED
Start: 2022-02-18 | End: 2022-02-18

## 2022-02-18 RX ADMIN — SODIUM CHLORIDE, POTASSIUM CHLORIDE, SODIUM LACTATE AND CALCIUM CHLORIDE 1000 ML: 600; 310; 30; 20 INJECTION, SOLUTION INTRAVENOUS at 18:35

## 2022-02-18 RX ADMIN — BETAMETHASONE SODIUM PHOSPHATE AND BETAMETHASONE ACETATE 12 MG: 3; 3 INJECTION, SUSPENSION INTRA-ARTICULAR; INTRALESIONAL; INTRAMUSCULAR at 18:36

## 2022-02-18 RX ADMIN — SODIUM CHLORIDE, POTASSIUM CHLORIDE, SODIUM LACTATE AND CALCIUM CHLORIDE 75 ML/HR: 600; 310; 30; 20 INJECTION, SOLUTION INTRAVENOUS at 22:49

## 2022-02-18 RX ADMIN — MAGNESIUM SULFATE IN WATER 1 G/HR: 40 INJECTION, SOLUTION INTRAVENOUS at 20:10

## 2022-02-18 RX ADMIN — SODIUM CHLORIDE, POTASSIUM CHLORIDE, SODIUM LACTATE AND CALCIUM CHLORIDE 1000 ML: 600; 310; 30; 20 INJECTION, SOLUTION INTRAVENOUS at 17:30

## 2022-02-18 RX ADMIN — CEFAZOLIN SODIUM 2 G: 1 INJECTION, POWDER, FOR SOLUTION INTRAMUSCULAR; INTRAVENOUS at 18:50

## 2022-02-18 RX ADMIN — MAGNESIUM SULFATE IN WATER 4 G/HR: 40 INJECTION, SOLUTION INTRAVENOUS at 19:44

## 2022-02-18 NOTE — PROGRESS NOTES
1702: The writer placed a call to Dr. Errol Valderrama and informed the MD of the patient's arrival to Labor and Delivery #5. The writer informed Dr. Errol Valderrama that the patient was a  with an EDC: 2022. IUP @ 22.4 weeks, has a towel between her legs and brownish colored fluid present on the towel. The writer informed Dr. Errol Valderrama that the patient stated that she had a cerclage in place and that she had a cerclage in place with her first pregnancy which she loss that pregnancy at 26 weeks. The writer inforrned Dr. Errol Valderrama that the patient stated that she had started Progesterone this past Monday. The following orders were received from Dr. Errol Valderrama: Aurelia Amaya a CBC, U/A ,ROM Plus and administer 2 liters of Lactated Ringers bolus. Lacey Lilly informed the writer that he would come. 1730: #20 Merlinda Stokes placed in the patient's right lateral antecubital space and the 1st liter of 1000ml L/R initiated at 999m/hr via the pump. No infiltration noted. 1750: The writer replaced a telephone call to Dr. Errol Valderrama. The writer informed Dr. Errol Valderrama that the mom was requesting that the [facility]. Dr. Errol Valderrama was informed by the writer that the mother stated that the patient felt like pulling out the IV and the mother stated that she felt like taking the patient to [facility]. Dr. Errol Valderrama informed the writer that he pass in the hospital and on his way to Labor and Delivery. 1754: Dr. Errol Valderrama here in Labor and Delivery. 1835: 2nd 1000ml L/R added and infusing at 999ml/hr via pump. No infiltration noted. Betamethasone 12mg given IM in left ventrogluteal muscle. Patient tolerated the the injection well. 1850: Ancef 2 grams IVP administered slowly each gram in 10ml of Normal Saline. No infiltration noted. 1910: Bedside report given to SARAHI Ledbetter RN and PREETHI Lopez RN. The writer received orders as followed to place a pantoja catheter in the patient, C&S and an UDS.     : Dr. Errol Valderrama received a phone call from Dr. Cash Landeros at [facility]. Report given to Dr. Selma Lay at [facility] per Claudia Man. The writer received a verbal order from Dr. Renetta Hernandez to obtain a Covid swab.

## 2022-02-18 NOTE — PROGRESS NOTES
1635 Pt. To L and d with co PROM and vaginal bleeding. To EFM. Pt. States had gush of vaginal fluid at 1600 today. Pt. had  delivery at 29 weeks in  and fetal death here at [address] Pt.has cerclage and hx of incompetent cervix. Pt. Started on progesterone Monday. [address]. Dr. Rizwan Guo notified of pts. admission. Orders received and noted. 1730 IV start, lab work drawn and to lab. ROM plus collected and sent to lab with ua. 1800 Dr. Rizwan Guo here, talks with the pt. and her Mother. Ultrasound done by Dr. Rizwan Guo. 1830 BMZ and Ancef given. 1800 Pts. Mother states wants her daughter transferred to [facility]. States we do not have the equipment to care for a 23 week baby. She feels we are wasting time and moving slow. Pts. Mother states she can take the pt. In her private car and just take out the IV. Dr. Rizwan Guo states this is not a good idea, we need to see her lab reports and stabilize her first and be sure she  Is stable for transport. Pts. Mother states she si getting very anxious and her daughter is also. Dr. Rizwan Guo tells pt. and her Mother he will do what ever they want him to do.    Dr. Rizwan Guo on phone with the transport center. Dr. Rizwan Guo talks with [facility] [place]  Dr. Malick Aceves, [facility] and Dr. Vidya Keys the Neonatolgist, they do not accept a baby of this gestation. They will call [facility] and see if they will accept the pt. The pt. was informed of this.  Speculum exam was done and VE, closed. Bloody fluid noted on exam.     Pts. Mother explained situation on the phone by Dr. Rizwan Guo.  Report given to night [facility].

## 2022-02-19 VITALS
RESPIRATION RATE: 18 BRPM | OXYGEN SATURATION: 100 % | DIASTOLIC BLOOD PRESSURE: 67 MMHG | HEIGHT: 65 IN | WEIGHT: 130 LBS | SYSTOLIC BLOOD PRESSURE: 111 MMHG | HEART RATE: 91 BPM | BODY MASS INDEX: 21.66 KG/M2 | TEMPERATURE: 98.5 F

## 2022-02-19 PROCEDURE — 96360 HYDRATION IV INFUSION INIT: CPT

## 2022-02-19 PROCEDURE — 96372 THER/PROPH/DIAG INJ SC/IM: CPT

## 2022-02-19 PROCEDURE — 96374 THER/PROPH/DIAG INJ IV PUSH: CPT

## 2022-02-19 PROCEDURE — 99285 EMERGENCY DEPT VISIT HI MDM: CPT

## 2022-02-19 NOTE — PROGRESS NOTES
OB NOTE    CHIEF COMPLAINT/HISTORY OF PRESENT ILLNESS: The patient is a 27year-old with an IUP of 22-4/7 weeks who is a  P:0,1,0,0 with an JUAN of 2022. She is blood type and group O positive, rubella immune and her GBS status is unknown. She has history of  section. Previous pregnancy was delivered at 25-week gestation due to nonreassuring fetal status secondary to placental abruption. She is being followed by [facility]. She underwent a rescue Garcia cerclage on 2022 (postoperative day #7) by Dr. Mt Castro. She presented today to L&D with complaints of profuse watery discharge starting at approximately 4 PM today. She also complains of spotting. She denies pelvic pressure or regular uterine contractions. MEDICATIONS: Prenatal multivitamins, Sandy    PHYSICAL EXAM:  Visit Vitals  BP (!) 105/57   Pulse 90   Temp 98.6 °F (37 °C)   Resp 18   Ht 5' 5\" (1.651 m)   Wt 59 kg (130 lb)   SpO2 100%   BMI 21.63 kg/m²     LUNGS: Clear to auscultation  HEART: Regular rhythm, no murmurs. Abdomen: Gravid, fetal heart tones present. PELVIC EXAM: Speculum exam: Dilation:0cm, Effacement: poor, membranes: Evidently ruptured, amniotic fluid volume noted. Cerclage noted in place, is not tense.       Recent Results (from the past 24 hour(s))   CBC WITH AUTOMATED DIFF    Collection Time: 22  5:42 PM   Result Value Ref Range    WBC 12.2 (H) 3.6 - 11.0 K/uL    RBC 3.02 (L) 3.80 - 5.20 M/uL    HGB 7.2 (L) 11.5 - 16.0 g/dL    HCT 24.8 (L) 35.0 - 47.0 %    MCV 82.1 80.0 - 99.0 FL    MCH 23.8 (L) 26.0 - 34.0 PG    MCHC 29.0 (L) 30.0 - 36.5 g/dL    RDW 17.3 (H) 11.5 - 14.5 %    PLATELET 089 881 - 851 K/uL    MPV 13.2 (H) 8.9 - 12.9 FL    NRBC 0.0 0.0  WBC    ABSOLUTE NRBC 0.00 0.00 - 0.01 K/uL    NEUTROPHILS 74 32 - 75 %    LYMPHOCYTES 14 12 - 49 %    MONOCYTES 10 5 - 13 %    EOSINOPHILS 1 0 - 7 %    BASOPHILS 0 0 - 1 %    IMMATURE GRANULOCYTES 1 (H) 0 - 0.5 %    ABS. NEUTROPHILS 9.1 (H) 1.8 - 8.0 K/UL    ABS. LYMPHOCYTES 1.8 0.8 - 3.5 K/UL    ABS. MONOCYTES 1.2 (H) 0.0 - 1.0 K/UL    ABS. EOSINOPHILS 0.1 0.0 - 0.4 K/UL    ABS. BASOPHILS 0.0 0.0 - 0.1 K/UL    ABS. IMM. GRANS. 0.1 (H) 0.00 - 0.04 K/UL    DF AUTOMATED     URINALYSIS W/MICROSCOPIC    Collection Time: 02/18/22  5:42 PM   Result Value Ref Range    Color Yellow/Straw      Appearance Clear Clear      Specific gravity 1.010 1.003 - 1.030      pH (UA) 6.0 5.0 - 8.0      Protein 100 (A) Negative mg/dL    Glucose Negative Negative mg/dL    Ketone Negative Negative mg/dL    Bilirubin Negative Negative      Blood Large (A) Negative      Urobilinogen 0.1 0.1 - 1.0 EU/dL    Nitrites Negative Negative      Leukocyte Esterase Negative Negative      WBC 0-4 0 - 4 /hpf    RBC >100 (H) 0 - 5 /hpf    Bacteria Negative Negative /hpf    Mucus Trace /lpf   RUPTURE OF FETAL MEMBRANE    Collection Time: 02/18/22  5:42 PM   Result Value Ref Range    Rupture of fetal membrane Positive (A) Negative     DRUG SCREEN, URINE    Collection Time: 02/18/22  7:00 PM   Result Value Ref Range    AMPHETAMINES Negative Negative      BARBITURATES Negative Negative      BENZODIAZEPINES Negative Negative      COCAINE Negative Negative      METHADONE Negative Negative      OPIATES Negative Negative      PCP(PHENCYCLIDINE) Negative Negative      THC (TH-CANNABINOL) Negative Negative      Drug screen comment        This test is a screen for drugs of abuse in a medical setting only (i.e., they are unconfirmed results and as such must not be used for non-medical purposes, e.g.,employment testing, legal testing). Due to its inherent nature, false positive (FP) and false negative (FN) results may be obtained. Therefore, if necessary for medical care, recommend confirmation of positive findings by GC/MS.    COVID-19 RAPID TEST    Collection Time: 02/18/22  7:22 PM   Result Value Ref Range    Specimen source Please find results under separate order      COVID-19 rapid test Not Detected Not Detected       Bedside ultrasound shows significantly decreased amniotic fluid volume    ASSESSMENT:    Intrauterine pregnancy at 22-4/7-week gestation   PROM  Cervical cerclage suture present in second trimester  Cervical insufficiency  Oligohydramnios    Patient and patient mother was requesting transfer to [place] [address].  Case consulted with MFM on call at [facility], Dr. Margaret Marquez who recommended transfer to [facility] due to extreme  delivery and hospital policy of no resuscitation earlier than 23-week gestation. Case was also consulted with Dr. John Paul Silverio, neonatologist at [facility] who kindly recommended the possibility of transferring patient baby to [facility]. After a conference call with Dr. Magaly Galaviz, neonatologist at [facility], Dr. John Paul Silverio and myself, a decision was made to transfer patient to [facility].     Plan:   Admit to OB mistry    IV antibiotics  Start betamethasone protocol  Magnesium sulfate for neuro protection  Transfer to [facility]

## 2022-02-19 NOTE — CONSULTS
Neonatology Prenatal Consult regarding Viability:    Resuscitative efforts and subsequent support of the extreme  :    I counseled mother that the decisions regarding the extent of resuscitative efforts and subsequent support of the extreme   are complex and often involve ethical considerations. We discussed these options as her decision determines if she should be transferred to [facility] or [address] [place].    For our institution, there is no initiation of resuscitation for newborns of less than 21 weeks gestational age. As she is currently 22 weeks and 4 days, we do have the option to consult OB and Neonatology at [facility] to see if they will accept her as their institution offers resuscitation starting at 22 weeks and 0 days. Mother was counseled that infants born at 25 weeks are extremely premature and the vast [de-identified] do not survive to discharge (with many dying soon after birth) and those that do usually have significant neurodevelopmental impairment. We discussed that survival rates can be a large range and depends on different factors such as infant's weight (currently unknown), sex (male infant), and whether ANS were received as well as the institution in which the infant is delivered. We discussed the different morbidities such as RDS/CLD, IVH, CP, NDI, etc.  Due to this high risk, decision on how to proceed after birth at gestational ages of 22-20 weeks is a the discretion of the parents. We discussed that some parents do not desire any resuscitative efforts and simply want to spend whatever time their infant has alive bonding and building memories. Some parents desire assessment at birth and then resuscitation only if infant is active. Lastly, there are parents that desire full resuscitation. We discussed that this is not an easy decision to make and there is no right or wrong answer. Mother desires full resuscitation of infant.       A four way call was conducted with [facility] OB, [facility] OB, [facility] Neonatology and myself. The case was discussed and the decision was made to accept mother for transfer. Thank you for this consultation.       I spent 15 minutes during this phone consultation, with more than 50% spent counseling        Signed: Veronica Muhammad MD  Today's Date: 2/18/2022  7:21 PM

## 2022-02-19 NOTE — PROGRESS NOTES
1910: Bedside shift report received from Aj Montez RN.    7638: Urinary catheter placed at this time by this RN and PREETHI Goddard, RN assisting using sterile technique at this time. 1921: Shift assessment completed at this time. 2018: Transfer report given to [facility], RN at [facility] and Delivery. 2106: Negative Covid Test results reported to RILEY Mayorga RN at [facility] and [facility]. 2235: Tim Bradley present at nurses station at this time. Report given. EMT uncomfortable with transport a patient with this Acuity alone. EMT reached out to supervisor at this time. Charge nurse made aware of situation at this time. 2245: [facility] supervisor on the phone at this time. EMT advised to wait for patient to be accepted by critical care team or they would send another EMT. 2300: This RN on the phone with [facility] critical care transport at this time. SBAR given awaiting approval.    2316: [facility] transport accepted patient at this time. ETA 30-40 mins. 0009: Critical care Transport team present at bedside at this time. 002: Patient transported off unit via stretcher by [place] [id number] Team for Transfer at this time.

## 2022-02-20 LAB
BACTERIA SPEC CULT: NORMAL
SPECIAL REQUESTS,SREQ: NORMAL

## (undated) DEVICE — PREMIUM WET SKIN PREP TRAY: Brand: MEDLINE INDUSTRIES, INC.

## (undated) DEVICE — LARGE, DISPOSABLE ALEXIS O C-SECTION PROTECTOR - RETRACTOR: Brand: ALEXIS ® O C-SECTION PROTECTOR - RETRACTOR

## (undated) DEVICE — SUTURE VCRL SZ 3-0 L27IN ABSRB UD KS L60MM STR REV CUT NDL J663H

## (undated) DEVICE — REM POLYHESIVE ADULT PATIENT RETURN ELECTRODE: Brand: VALLEYLAB

## (undated) DEVICE — STRAP RESTRAIN W3.5XL19IN TECLIN STRRP POS LEG DURING LITH

## (undated) DEVICE — SOL IRR SOD CL 0.9% 1000ML BTL --

## (undated) DEVICE — SUTURE ABSORBABLE MONOFILAMENT 0 CTX 36 IN VIO PDS + PDP370T

## (undated) DEVICE — C-SECTION: Brand: MEDLINE INDUSTRIES, INC.

## (undated) DEVICE — STERILE POLYISOPRENE POWDER-FREE SURGICAL GLOVES: Brand: PROTEXIS

## (undated) DEVICE — SUTURE MCRYL SZ 3-0 L36IN ABSRB UD L36MM CT-1 1/2 CIR Y944H

## (undated) DEVICE — BAG,SPONGE COUNTER,CLEAR,50/BX,5BX/CS: Brand: MEDLINE

## (undated) DEVICE — TRAY URIN CATH PED 16FR BLLN 5CC INDWL STR TIP INF CTRL

## (undated) DEVICE — GARMENT CMPR STD UNV CALF FLWT -- RN VENTILATE NS DISP 17- IN

## (undated) DEVICE — APPLICATOR SCRB 26ML TEAL STRL -- CHLORAPREP 26ML

## (undated) DEVICE — SUTURE VCRL SZ 0 L36IN ABSRB UD L40MM CT 1/2 CIR TAPERPOINT J958H

## (undated) DEVICE — FORCEPS TISS L6.25IN S STL ADAIR STYL

## (undated) DEVICE — SYMMETRY® CLAMP, PENNINGTON, 8 IN: Brand: SYMMETRY PENNINGTON

## (undated) DEVICE — HANDLE SURG LIGHT OB

## (undated) DEVICE — DRAPE C-SECTION W/WINDOW --